# Patient Record
Sex: FEMALE | Race: WHITE | NOT HISPANIC OR LATINO | Employment: PART TIME | URBAN - METROPOLITAN AREA
[De-identification: names, ages, dates, MRNs, and addresses within clinical notes are randomized per-mention and may not be internally consistent; named-entity substitution may affect disease eponyms.]

---

## 2017-01-13 ENCOUNTER — OFFICE VISIT (OUTPATIENT)
Dept: INTERNAL MEDICINE | Facility: CLINIC | Age: 62
End: 2017-01-13

## 2017-01-13 ENCOUNTER — APPOINTMENT (OUTPATIENT)
Dept: WOMENS IMAGING | Facility: HOSPITAL | Age: 62
End: 2017-01-13

## 2017-01-13 VITALS
HEART RATE: 92 BPM | SYSTOLIC BLOOD PRESSURE: 128 MMHG | WEIGHT: 219.2 LBS | DIASTOLIC BLOOD PRESSURE: 82 MMHG | BODY MASS INDEX: 33.22 KG/M2 | HEIGHT: 68 IN

## 2017-01-13 DIAGNOSIS — I10 ESSENTIAL HYPERTENSION: Primary | ICD-10-CM

## 2017-01-13 DIAGNOSIS — Z00.00 ANNUAL PHYSICAL EXAM: ICD-10-CM

## 2017-01-13 DIAGNOSIS — F41.9 ANXIETY: ICD-10-CM

## 2017-01-13 DIAGNOSIS — Z12.4 SCREENING FOR MALIGNANT NEOPLASM OF CERVIX: ICD-10-CM

## 2017-01-13 DIAGNOSIS — M85.80 OSTEOPENIA: ICD-10-CM

## 2017-01-13 LAB
ALBUMIN SERPL-MCNC: 4.2 G/DL (ref 3.4–4.6)
ALBUMIN/GLOB SERPL: 1.1 G/DL
ALP SERPL-CCNC: 77 U/L (ref 46–116)
ALT SERPL W P-5'-P-CCNC: 23 U/L (ref 14–59)
ANION GAP SERPL CALCULATED.3IONS-SCNC: 10 MMOL/L
AST SERPL-CCNC: 17 U/L (ref 7–37)
BASOPHILS # BLD AUTO: 0.04 10*3/MM3 (ref 0–0.2)
BASOPHILS NFR BLD AUTO: 0.5 % (ref 0–2)
BILIRUB SERPL-MCNC: 0.6 MG/DL (ref 0.2–1)
BUN BLD-MCNC: 12 MG/DL (ref 6–22)
BUN/CREAT SERPL: 15.8 (ref 7–25)
CALCIUM SPEC-SCNC: 9.2 MG/DL (ref 8.6–10.5)
CHLORIDE SERPL-SCNC: 97 MMOL/L (ref 95–107)
CHOLEST SERPL-MCNC: 202 MG/DL (ref 0–200)
CO2 SERPL-SCNC: 30 MMOL/L (ref 23–32)
CREAT BLD-MCNC: 0.76 MG/DL (ref 0.55–1.02)
DEPRECATED RDW RBC AUTO: 40.6 FL (ref 37–54)
EOSINOPHIL # BLD AUTO: 0.29 10*3/MM3 (ref 0–0.7)
EOSINOPHIL NFR BLD AUTO: 3.3 % (ref 0–5)
ERYTHROCYTE [DISTWIDTH] IN BLOOD BY AUTOMATED COUNT: 13 % (ref 11.5–15)
GFR SERPL CREATININE-BSD FRML MDRD: 78 ML/MIN/1.73
GLOBULIN UR ELPH-MCNC: 3.9 GM/DL
GLUCOSE BLD-MCNC: 103 MG/DL (ref 70–100)
HCT VFR BLD AUTO: 40.5 % (ref 34.1–44.9)
HDLC SERPL-MCNC: 72 MG/DL (ref 40–81)
HGB BLD-MCNC: 13.2 G/DL (ref 11.2–15.7)
LDLC SERPL CALC-MCNC: 105 MG/DL (ref 0–100)
LDLC/HDLC SERPL: 1.45 {RATIO}
LYMPHOCYTES # BLD AUTO: 1.94 10*3/MM3 (ref 0.8–7)
LYMPHOCYTES NFR BLD AUTO: 22.2 % (ref 10–60)
MCH RBC QN AUTO: 28.4 PG (ref 26–34)
MCHC RBC AUTO-ENTMCNC: 32.6 G/DL (ref 31–37)
MCV RBC AUTO: 87.1 FL (ref 80–100)
MONOCYTES # BLD AUTO: 0.88 10*3/MM3 (ref 0–1)
MONOCYTES NFR BLD AUTO: 10.1 % (ref 0–13)
NEUTROPHILS # BLD AUTO: 5.57 10*3/MM3 (ref 1–11)
NEUTROPHILS NFR BLD AUTO: 63.9 % (ref 30–85)
PLATELET # BLD AUTO: 302 10*3/MM3 (ref 150–450)
PMV BLD AUTO: 10 FL (ref 6–12)
POTASSIUM BLD-SCNC: 4.2 MMOL/L (ref 3.3–5.3)
PROT SERPL-MCNC: 8.1 G/DL (ref 6.3–8.4)
RBC # BLD AUTO: 4.65 10*6/MM3 (ref 3.93–5.22)
SODIUM BLD-SCNC: 137 MMOL/L (ref 136–145)
T4 FREE SERPL-MCNC: 1.01 NG/DL (ref 0.93–1.7)
TRIGL SERPL-MCNC: 127 MG/DL (ref 0–150)
TSH SERPL DL<=0.05 MIU/L-ACNC: 1.13 MIU/ML (ref 0.4–4.2)
VLDLC SERPL-MCNC: 25.4 MG/DL
WBC NRBC COR # BLD: 8.72 10*3/MM3 (ref 5–10)

## 2017-01-13 PROCEDURE — 71020 CHG CHEST X-RAY 2 VW: CPT | Performed by: RADIOLOGY

## 2017-01-13 PROCEDURE — 93000 ELECTROCARDIOGRAM COMPLETE: CPT | Performed by: INTERNAL MEDICINE

## 2017-01-13 PROCEDURE — 80061 LIPID PANEL: CPT | Performed by: INTERNAL MEDICINE

## 2017-01-13 PROCEDURE — 84443 ASSAY THYROID STIM HORMONE: CPT | Performed by: INTERNAL MEDICINE

## 2017-01-13 PROCEDURE — 36415 COLL VENOUS BLD VENIPUNCTURE: CPT | Performed by: INTERNAL MEDICINE

## 2017-01-13 PROCEDURE — 85025 COMPLETE CBC W/AUTO DIFF WBC: CPT | Performed by: INTERNAL MEDICINE

## 2017-01-13 PROCEDURE — 99396 PREV VISIT EST AGE 40-64: CPT | Performed by: INTERNAL MEDICINE

## 2017-01-13 PROCEDURE — 80053 COMPREHEN METABOLIC PANEL: CPT | Performed by: INTERNAL MEDICINE

## 2017-01-13 PROCEDURE — 71020 XR CHEST 2 VW: CPT | Performed by: INTERNAL MEDICINE

## 2017-01-13 NOTE — PROGRESS NOTES
Subjective   Karishma Ocasio is a 61 y.o. female.     HPI Comments: Here for annual physical    Hypertension   This is a chronic problem. The current episode started more than 1 year ago. Pertinent negatives include no chest pain or palpitations.        The following portions of the patient's history were reviewed and updated as appropriate: allergies, current medications, past family history, past medical history, past social history, past surgical history and problem list.    Review of Systems   Constitutional:        Has been doing wellNonew problems   HENT: Positive for postnasal drip (HAs a cold).    Eyes: Positive for visual disturbance (Reading glasses).   Respiratory: Negative.    Cardiovascular: Negative for chest pain and palpitations.        BP checks have been satisfactory   Gastrointestinal:        Will suzette Cscope next year   Endocrine: Negative.    Genitourinary: Negative.    Musculoskeletal: Negative.    Allergic/Immunologic: Negative.    Neurological: Negative.    Hematological: Negative.    Psychiatric/Behavioral: Negative.        Objective   Physical Exam   Constitutional: She is oriented to person, place, and time. She appears well-developed and well-nourished.   HENT:   Head: Normocephalic.   Right Ear: External ear normal.   Left Ear: External ear normal.   Nose: Nose normal.   Mouth/Throat: Oropharynx is clear and moist.   Eyes: Conjunctivae and EOM are normal. Pupils are equal, round, and reactive to light.   Neck: Normal range of motion. Neck supple.   Cardiovascular: Normal rate, regular rhythm, normal heart sounds and intact distal pulses.    Repeat 144/90   Pulmonary/Chest: Effort normal and breath sounds normal.   Abdominal: Soft. Bowel sounds are normal.   Genitourinary: Vagina normal and uterus normal.   Musculoskeletal: Normal range of motion.   Neurological: She is alert and oriented to person, place, and time. She has normal reflexes.   Skin: Skin is warm and dry.   Psychiatric: She  has a normal mood and affect. Her behavior is normal. Judgment and thought content normal.   Vitals reviewed.      Assessment/Plan   Karishma was seen today for annual exam.    Diagnoses and all orders for this visit:    Essential hypertension  -     CBC Auto Differential; Future  -     Comprehensive Metabolic Panel; Future  -     XR Chest 2 View    Annual physical exam  -     Lipid Panel; Future  -     TSH; Future  -     T4, Free; Future    Screening for malignant neoplasm of cervix  -     Pap IG, HPV-hr; Future    Osteopenia    Anxiety      ECG 12 Lead  Date/Time: 1/13/2017 10:17 AM  Performed by: MADDIE RODRIGUEZ  Authorized by: MADDIE RODRIGUEZ   Rhythm: sinus rhythm  Conduction: conduction normal  ST Segments: ST segments normal  T Waves: T waves normal  Other: no other findings  Clinical impression: normal ECG  Comments: No priors

## 2017-01-13 NOTE — MR AVS SNAPSHOT
Karishma MANZO Ninfa   1/13/2017 9:30 AM   Office Visit    Dept Phone:  [phone]   Encounter #:  [id number]    Provider:  Kane Álvarez MD   Department:  [facility] INTERNAL MEDICINE                Your Full Care Plan              Your Updated Medication List          This list is accurate as of: 1/13/17 11:11 AM.  Always use your most recent med list.                CENTRUM SILVER ULTRA WOMENS PO       escitalopram 10 MG tablet   Commonly known as:  LEXAPRO   take 1 tablet by mouth once daily       * triamterene-hydrochlorothiazide 37.5-25 MG per tablet   Commonly known as:  MAXZIDE-25   Take 1 tablet by mouth daily.       * triamterene-hydrochlorothiazide 37.5-25 MG per capsule   Commonly known as:  DYAZIDE   take 1 capsule by mouth once daily       * Notice:  This list has 2 medication(s) that are the same as other medications prescribed for you. Read the directions carefully, and ask your doctor or other care provider to review them with you.            We Performed the Following     ECG 12 Lead     XR Chest 2 View       You Were Diagnosed With        Codes Comments    Essential hypertension    -  Primary ICD-10-CM: I10  ICD-9-CM: 401.9     Annual physical exam     ICD-10-CM: Z00.00  ICD-9-CM: V70.0     Screening for malignant neoplasm of cervix     ICD-10-CM: Z12.4  ICD-9-CM: V76.2     Osteopenia     ICD-10-CM: M85.80  ICD-9-CM: 733.90     Anxiety     ICD-10-CM: F41.9  ICD-9-CM: 300.00       Instructions     None    Patient Instructions History      Upcoming Appointments     Visit Type Date Time Department    PHYSICAL 1/13/2017  9:30 AM [facility]    M[facility]MO [facility] SCREEN BILAT 1/24/2017  9:15 AM  [facility]      [facility] Signup     [employer] [facility] [email or web address] allows you to send messages to your doctor, view your test results, renew your prescriptions, schedule appointments, and more. To sign up, go to [employer] and click on the Sign Up Now link in the New  "User? box. Enter your [employer] Activation Code exactly as it appears below along with the last four digits of your Social Security Number and your Date of Birth () to complete the sign-up process. If you do not sign up before the expiration date, you must request a new code.    [employer] Activation Code: [email or web address]  Expires: 2017 11:11 AM    If you have questions, you can email [email or web address] or call [phone] to talk to our [employer] staff. Remember, [employer] is NOT to be used for urgent needs. For medical emergencies, dial 911.               Other Info from Your Visit           Your Appointments     2017  9:15 AM EST   Mammo daysi screen bilat with [facility] 2   [facility] ([place])    [address] [facility] [address] [phone]            Bring any films and reports from outside facilities. Arrive 15 min prior to exam time. Do not apply any lotions, oils, body sprays, powders, perfumes, or deodorants on the day of the exam. Bring a current list of medications. Arrive 15 mi              Allergies     No Known Allergies      Reason for Visit     Annual Exam CPE      Vital Signs     Blood Pressure Pulse Height Weight Body Mass Index Smoking Status    128/82 92 68\" (172.7 cm) 219 lb 3.2 oz (99.4 kg) 33.33 kg/m2 Former Smoker      Problems and Diagnoses Noted     Anxiety problem    High blood pressure    Bone disease    Annual physical exam        Screening for cervical cancer          Results     XR Chest 2 View               "

## 2017-01-17 LAB
CHROM ANALY OVERALL INTERP-IMP: NORMAL
CONV .: NORMAL
CONV PERFORMED BY:: NORMAL
DX ICD CODE: NORMAL
HIV 1 & 2 AB SER-IMP: NORMAL
HPV I/H RISK 1 DNA CVX QL PROBE+SIG AMP: NEGATIVE
REF LAB TEST METHOD: NORMAL
STAT OF ADQ CVX/VAG CYTO-IMP: NORMAL

## 2017-01-24 ENCOUNTER — HOSPITAL ENCOUNTER (OUTPATIENT)
Dept: MAMMOGRAPHY | Facility: HOSPITAL | Age: 62
Discharge: HOME | End: 2017-01-24
Attending: INTERNAL MEDICINE | Admitting: INTERNAL MEDICINE

## 2017-01-24 DIAGNOSIS — Z13.9 SCREENING: ICD-10-CM

## 2017-01-24 PROCEDURE — G0202 SCR MAMMO BI INCL CAD: HCPCS

## 2017-03-13 ENCOUNTER — OFFICE VISIT (OUTPATIENT)
Dept: INTERNAL MEDICINE | Facility: CLINIC | Age: 62
End: 2017-03-13

## 2017-03-13 VITALS
SYSTOLIC BLOOD PRESSURE: 144 MMHG | WEIGHT: 219 LBS | HEART RATE: 81 BPM | DIASTOLIC BLOOD PRESSURE: 98 MMHG | OXYGEN SATURATION: 98 % | HEIGHT: 68 IN | BODY MASS INDEX: 33.19 KG/M2

## 2017-03-13 DIAGNOSIS — Z86.711 HX PULMONARY EMBOLISM: ICD-10-CM

## 2017-03-13 DIAGNOSIS — R00.2 PALPITATIONS: ICD-10-CM

## 2017-03-13 DIAGNOSIS — I10 ESSENTIAL HYPERTENSION: Primary | ICD-10-CM

## 2017-03-13 PROCEDURE — 99213 OFFICE O/P EST LOW 20 MIN: CPT | Performed by: INTERNAL MEDICINE

## 2017-03-23 ENCOUNTER — HOSPITAL ENCOUNTER (OUTPATIENT)
Dept: CARDIOLOGY | Facility: HOSPITAL | Age: 62
Discharge: HOME | End: 2017-03-23
Attending: INTERNAL MEDICINE

## 2017-03-23 ENCOUNTER — HOSPITAL ENCOUNTER (OUTPATIENT)
Dept: CARDIOLOGY | Facility: HOSPITAL | Age: 62
Discharge: HOME | End: 2017-03-23
Attending: INTERNAL MEDICINE | Admitting: INTERNAL MEDICINE

## 2017-03-23 VITALS
DIASTOLIC BLOOD PRESSURE: 40 MMHG | HEART RATE: 92 BPM | SYSTOLIC BLOOD PRESSURE: 140 MMHG | WEIGHT: 219 LBS | BODY MASS INDEX: 33.19 KG/M2 | HEIGHT: 68 IN

## 2017-03-23 DIAGNOSIS — R00.2 PALPITATIONS: ICD-10-CM

## 2017-03-23 LAB
ASCENDING AORTA: 3.4 CM
BH CV ECHO MEAS - ACS: 2.3 CM
BH CV ECHO MEAS - AO MAX PG (FULL): 4.4 MMHG
BH CV ECHO MEAS - AO MAX PG: 8 MMHG
BH CV ECHO MEAS - AO MEAN PG (FULL): 2.5 MMHG
BH CV ECHO MEAS - AO MEAN PG: 4.2 MMHG
BH CV ECHO MEAS - AO ROOT AREA (BSA CORRECTED): 1.7
BH CV ECHO MEAS - AO ROOT AREA: 9.8 CM^2
BH CV ECHO MEAS - AO ROOT DIAM: 3.5 CM
BH CV ECHO MEAS - AO V2 MAX: 141.2 CM/SEC
BH CV ECHO MEAS - AO V2 MEAN: 96.3 CM/SEC
BH CV ECHO MEAS - AO V2 VTI: 28.8 CM
BH CV ECHO MEAS - AVA(I,A): 2 CM^2
BH CV ECHO MEAS - AVA(I,D): 2 CM^2
BH CV ECHO MEAS - AVA(V,A): 2.6 CM^2
BH CV ECHO MEAS - AVA(V,D): 2.6 CM^2
BH CV ECHO MEAS - BSA(HAYCOCK): 2.2 M^2
BH CV ECHO MEAS - BSA: 2.1 M^2
BH CV ECHO MEAS - BZI_BMI: 33.1 KILOGRAMS/M^2
BH CV ECHO MEAS - BZI_METRIC_HEIGHT: 172.7 CM
BH CV ECHO MEAS - BZI_METRIC_WEIGHT: 98.9 KG
BH CV ECHO MEAS - CONTRAST EF (2CH): 56.7 ML/M^2
BH CV ECHO MEAS - CONTRAST EF 4CH: 62.6 ML/M^2
BH CV ECHO MEAS - EDV(MOD-SP2): 67 ML
BH CV ECHO MEAS - EDV(MOD-SP4): 91 ML
BH CV ECHO MEAS - EDV(TEICH): 125.3 ML
BH CV ECHO MEAS - EF(CUBED): 80.6 %
BH CV ECHO MEAS - EF(MOD-SP2): 56.7 %
BH CV ECHO MEAS - EF(MOD-SP4): 62.6 %
BH CV ECHO MEAS - EF(TEICH): 72.7 %
BH CV ECHO MEAS - ESV(MOD-SP2): 29 ML
BH CV ECHO MEAS - ESV(MOD-SP4): 34 ML
BH CV ECHO MEAS - ESV(TEICH): 34.1 ML
BH CV ECHO MEAS - FS: 42.1 %
BH CV ECHO MEAS - IVS/LVPW: 1
BH CV ECHO MEAS - IVSD: 1.1 CM
BH CV ECHO MEAS - LAT PEAK E' VEL: 10 CM/SEC
BH CV ECHO MEAS - LV DIASTOLIC VOL/BSA (35-75): 42.9 ML/M^2
BH CV ECHO MEAS - LV MASS(C)D: 204.5 GRAMS
BH CV ECHO MEAS - LV MASS(C)DI: 96.5 GRAMS/M^2
BH CV ECHO MEAS - LV MAX PG: 3.6 MMHG
BH CV ECHO MEAS - LV MEAN PG: 1.8 MMHG
BH CV ECHO MEAS - LV SYSTOLIC VOL/BSA (12-30): 16 ML/M^2
BH CV ECHO MEAS - LV V1 MAX: 95.1 CM/SEC
BH CV ECHO MEAS - LV V1 MEAN: 62 CM/SEC
BH CV ECHO MEAS - LV V1 VTI: 15.5 CM
BH CV ECHO MEAS - LVIDD: 5.1 CM
BH CV ECHO MEAS - LVIDS: 3 CM
BH CV ECHO MEAS - LVLD AP2: 7.4 CM
BH CV ECHO MEAS - LVLD AP4: 8.3 CM
BH CV ECHO MEAS - LVLS AP2: 6.3 CM
BH CV ECHO MEAS - LVLS AP4: 6.7 CM
BH CV ECHO MEAS - LVOT AREA (M): 3.8 CM^2
BH CV ECHO MEAS - LVOT AREA: 3.8 CM^2
BH CV ECHO MEAS - LVOT DIAM: 2.2 CM
BH CV ECHO MEAS - LVPWD: 1.1 CM
BH CV ECHO MEAS - MED PEAK E' VEL: 6 CM/SEC
BH CV ECHO MEAS - MV A DUR: 0.13 SEC
BH CV ECHO MEAS - MV A MAX VEL: 90.7 CM/SEC
BH CV ECHO MEAS - MV DEC SLOPE: 191.7 CM/SEC^2
BH CV ECHO MEAS - MV DEC TIME: 0.23 SEC
BH CV ECHO MEAS - MV E MAX VEL: 42.2 CM/SEC
BH CV ECHO MEAS - MV E/A: 0.47
BH CV ECHO MEAS - MV MAX PG: 3.6 MMHG
BH CV ECHO MEAS - MV MEAN PG: 1.5 MMHG
BH CV ECHO MEAS - MV P1/2T MAX VEL: 48 CM/SEC
BH CV ECHO MEAS - MV P1/2T: 73.4 MSEC
BH CV ECHO MEAS - MV V2 MAX: 95 CM/SEC
BH CV ECHO MEAS - MV V2 MEAN: 56.4 CM/SEC
BH CV ECHO MEAS - MV V2 VTI: 17.3 CM
BH CV ECHO MEAS - MVA P1/2T LCG: 4.6 CM^2
BH CV ECHO MEAS - MVA(P1/2T): 3 CM^2
BH CV ECHO MEAS - MVA(VTI): 3.4 CM^2
BH CV ECHO MEAS - PA ACC TIME: 0.17 SEC
BH CV ECHO MEAS - PA MAX PG (FULL): 1.7 MMHG
BH CV ECHO MEAS - PA MAX PG: 4.7 MMHG
BH CV ECHO MEAS - PA PR(ACCEL): 4.5 MMHG
BH CV ECHO MEAS - PA V2 MAX: 108.6 CM/SEC
BH CV ECHO MEAS - PULM DIAS VEL: 41.2 CM/SEC
BH CV ECHO MEAS - PULM S/D: 1.3
BH CV ECHO MEAS - PULM SYS VEL: 52.9 CM/SEC
BH CV ECHO MEAS - PVA(V,A): 2 CM^2
BH CV ECHO MEAS - PVA(V,D): 2 CM^2
BH CV ECHO MEAS - QP/QS: 0.85
BH CV ECHO MEAS - RAP SYSTOLE: 3 MMHG
BH CV ECHO MEAS - RV MAX PG: 3 MMHG
BH CV ECHO MEAS - RV MEAN PG: 1.6 MMHG
BH CV ECHO MEAS - RV V1 MAX: 86.8 CM/SEC
BH CV ECHO MEAS - RV V1 MEAN: 59.4 CM/SEC
BH CV ECHO MEAS - RV V1 VTI: 19.6 CM
BH CV ECHO MEAS - RVOT AREA: 2.6 CM^2
BH CV ECHO MEAS - RVOT DIAM: 1.8 CM
BH CV ECHO MEAS - RVSP: 17 MMHG
BH CV ECHO MEAS - SI(AO): 133.8 ML/M^2
BH CV ECHO MEAS - SI(CUBED): 51.2 ML/M^2
BH CV ECHO MEAS - SI(LVOT): 27.8 ML/M^2
BH CV ECHO MEAS - SI(MOD-SP2): 17.9 ML/M^2
BH CV ECHO MEAS - SI(MOD-SP4): 26.9 ML/M^2
BH CV ECHO MEAS - SI(TEICH): 43 ML/M^2
BH CV ECHO MEAS - SUP REN AO DIAM: 1.8 CM
BH CV ECHO MEAS - SV(AO): 283.6 ML
BH CV ECHO MEAS - SV(CUBED): 108.5 ML
BH CV ECHO MEAS - SV(LVOT): 58.9 ML
BH CV ECHO MEAS - SV(MOD-SP2): 38 ML
BH CV ECHO MEAS - SV(MOD-SP4): 57 ML
BH CV ECHO MEAS - SV(RVOT): 50.1 ML
BH CV ECHO MEAS - SV(TEICH): 91.1 ML
BH CV ECHO MEAS - TAPSE (>1.6): 1.8 CM2
BH CV ECHO MEAS - TR MAX VEL: 186 CM/SEC
BH CV XLRA - RV BASE: 2.4 CM
BH CV XLRA - TDI S': 11 CM/SEC
E/E' RATIO: 6
LEFT ATRIUM VOLUME INDEX: 14 ML/M2
SINUS: 3.2 CM
STJ: 2.8 CM

## 2017-03-23 PROCEDURE — 93306 TTE W/DOPPLER COMPLETE: CPT | Performed by: INTERNAL MEDICINE

## 2017-03-23 PROCEDURE — 93306 TTE W/DOPPLER COMPLETE: CPT

## 2017-03-28 ENCOUNTER — TELEPHONE (OUTPATIENT)
Dept: INTERNAL MEDICINE | Facility: CLINIC | Age: 62
End: 2017-03-28

## 2017-03-28 NOTE — TELEPHONE ENCOUNTER
----- Message from Celina Galarza sent at 3/28/2017 10:59 AM EDT -----  Contact: pt  Please call pt back today with Holter results. She is leaving for [place] tomorrow. Thanks    Pt # [phone]

## 2017-04-10 ENCOUNTER — OFFICE VISIT (OUTPATIENT)
Dept: INTERNAL MEDICINE | Facility: CLINIC | Age: 62
End: 2017-04-10

## 2017-04-10 VITALS
WEIGHT: 218 LBS | HEART RATE: 118 BPM | HEIGHT: 68 IN | OXYGEN SATURATION: 96 % | BODY MASS INDEX: 33.04 KG/M2 | DIASTOLIC BLOOD PRESSURE: 88 MMHG | SYSTOLIC BLOOD PRESSURE: 130 MMHG

## 2017-04-10 DIAGNOSIS — I10 ESSENTIAL HYPERTENSION: Primary | ICD-10-CM

## 2017-04-10 DIAGNOSIS — M85.80 OSTEOPENIA: ICD-10-CM

## 2017-04-10 PROCEDURE — 99213 OFFICE O/P EST LOW 20 MIN: CPT | Performed by: INTERNAL MEDICINE

## 2017-04-10 RX ORDER — ATENOLOL 25 MG/1
25 TABLET ORAL DAILY
Qty: 30 TABLET | Refills: 3 | Status: SHIPPED | OUTPATIENT
Start: 2017-04-10 | End: 2017-08-10 | Stop reason: SDUPTHER

## 2017-04-10 NOTE — PROGRESS NOTES
Subjective   Karishma Ocasio is a 61 y.o. female.     Hypertension   This is a chronic problem. Associated symptoms include palpitations (Will check her pulse & is usually around 90 ).        The following portions of the patient's history were reviewed and updated as appropriate: allergies, current medications, past family history, past medical history, past social history, past surgical history and problem list.    Review of Systems   Constitutional:        Here for F/U tachypalpitations   HENT: Negative.    Eyes: Negative.    Respiratory: Negative.    Cardiovascular: Positive for palpitations (Will check her pulse & is usually around 90 ).        Had cardiac W/U for rapid heart beat which was essentially normal    Genitourinary: Negative.    Musculoskeletal: Negative.    Neurological: Negative.        Objective   Physical Exam   Constitutional: She appears well-developed.   HENT:   Head: Normocephalic.   Eyes: EOM are normal.   Neck: Neck supple.   Cardiovascular: Normal rate, regular rhythm and normal heart sounds.    Repeat 130/90   Pulmonary/Chest: Effort normal and breath sounds normal.   Musculoskeletal: Normal range of motion.   Neurological: She is alert.       Assessment/Plan   Karishma was seen today for follow-up and hypertension.    Diagnoses and all orders for this visit:    Essential hypertension  -     atenolol (TENORMIN) 25 MG tablet; Take 1 tablet by mouth Daily.    Osteopenia

## 2017-05-11 ENCOUNTER — OFFICE VISIT (OUTPATIENT)
Dept: INTERNAL MEDICINE | Facility: CLINIC | Age: 62
End: 2017-05-11

## 2017-05-11 VITALS
SYSTOLIC BLOOD PRESSURE: 124 MMHG | DIASTOLIC BLOOD PRESSURE: 90 MMHG | WEIGHT: 220 LBS | OXYGEN SATURATION: 93 % | HEIGHT: 68 IN | BODY MASS INDEX: 33.34 KG/M2 | HEART RATE: 72 BPM

## 2017-05-11 DIAGNOSIS — I10 ESSENTIAL HYPERTENSION: Primary | ICD-10-CM

## 2017-05-11 PROCEDURE — 99213 OFFICE O/P EST LOW 20 MIN: CPT | Performed by: INTERNAL MEDICINE

## 2017-07-03 ENCOUNTER — OFFICE VISIT (OUTPATIENT)
Dept: INTERNAL MEDICINE | Facility: CLINIC | Age: 62
End: 2017-07-03

## 2017-07-03 VITALS
TEMPERATURE: 98.4 F | DIASTOLIC BLOOD PRESSURE: 82 MMHG | WEIGHT: 220 LBS | HEART RATE: 84 BPM | BODY MASS INDEX: 33.34 KG/M2 | HEIGHT: 68 IN | SYSTOLIC BLOOD PRESSURE: 120 MMHG | OXYGEN SATURATION: 94 %

## 2017-07-03 DIAGNOSIS — L23.9 ALLERGIC CONTACT DERMATITIS, UNSPECIFIED TRIGGER: Primary | ICD-10-CM

## 2017-07-03 PROCEDURE — 99213 OFFICE O/P EST LOW 20 MIN: CPT | Performed by: NURSE PRACTITIONER

## 2017-07-04 PROBLEM — I26.99 PE (PULMONARY EMBOLISM): Status: ACTIVE | Noted: 2017-07-04

## 2017-07-04 RX ORDER — DEXAMETHASONE 1.5 MG/1
TABLET ORAL
Qty: 35 TABLET | Refills: 0 | OUTPATIENT
Start: 2017-07-04 | End: 2017-09-05

## 2017-07-04 NOTE — PROGRESS NOTES
Subjective   Karishma Ocasio is a 61 y.o. female who presents due to a rash.    HPI Comments: She continues to co a pruritic rash which is slowly spreading despite completing a Medrol Jayro (recent exposure to poison ivy). She now c/o a rash with swelling on her left upper eyelid.    Poison Ivy   This is a new problem. The current episode started 1 to 4 weeks ago. The problem has been gradually worsening since onset. The affected locations include the left eye, left upper leg, left lower leg, right upper leg, left hand, left arm, left elbow, right arm and right elbow. The rash is characterized by redness and itchiness. She was exposed to plant contact. Pertinent negatives include no congestion, cough, fatigue, fever, sore throat or vomiting. Treatments tried: Medrol Dose Jayro. The treatment provided mild relief.        The following portions of the patient's history were reviewed and updated as appropriate: allergies, current medications, past social history and problem list.    Past Medical History:   Diagnosis Date   • Anxiety    • Hypertension    • Osteopenia          Current Outpatient Prescriptions:   •  atenolol (TENORMIN) 25 MG tablet, Take 1 tablet by mouth Daily., Disp: 30 tablet, Rfl: 3  •  escitalopram (LEXAPRO) 10 MG tablet, take 1 tablet by mouth once daily, Disp: 90 tablet, Rfl: 3  •  Multiple Vitamins-Minerals (CENTRUM SILVER ULTRA WOMENS PO), Take  by mouth daily., Disp: , Rfl:   •  triamterene-hydrochlorothiazide (DYAZIDE) 37.5-25 MG per capsule, take 1 capsule by mouth once daily, Disp: 90 capsule, Rfl: 3  •  dexamethasone (DECADRON) 1.5 MG tablet, Directions for 10 day Dexpak, Disp: 35 tablet, Rfl: 0    No Known Allergies    Review of Systems   Constitutional: Negative for chills, fatigue, fever and unexpected weight change.   HENT: Negative for congestion, ear pain, postnasal drip, sinus pressure, sore throat and trouble swallowing.    Eyes: Negative for visual disturbance.   Respiratory: Negative  "for cough, chest tightness and wheezing.    Cardiovascular: Negative for chest pain, palpitations and leg swelling.   Gastrointestinal: Negative for abdominal pain, blood in stool, nausea and vomiting.   Endocrine: Negative for cold intolerance and heat intolerance.   Genitourinary: Negative for dysuria, frequency and urgency.   Musculoskeletal: Negative for arthralgias and joint swelling.   Skin: Positive for rash. Negative for color change.   Allergic/Immunologic: Negative for immunocompromised state.   Neurological: Negative for syncope, weakness and headaches.   Hematological: Does not bruise/bleed easily.       Objective   Vitals:    07/03/17 0833   BP: 120/82   BP Location: Left arm   Pulse: 84   Temp: 98.4 °F (36.9 °C)   SpO2: 94%   Weight: 220 lb (99.8 kg)   Height: 68\" (172.7 cm)     Physical Exam   Constitutional: She is oriented to person, place, and time. She appears well-developed and well-nourished. No distress.   HENT:   Head: Normocephalic and atraumatic.   Right Ear: External ear normal.   Left Ear: External ear normal.   Eyes: Conjunctivae are normal.   Neck: Normal range of motion. Neck supple.   Cardiovascular: Normal rate, regular rhythm, normal heart sounds and intact distal pulses.  Exam reveals no gallop and no friction rub.    No murmur heard.  Pulmonary/Chest: Effort normal and breath sounds normal. No respiratory distress.   Lymphadenopathy:     She has no cervical adenopathy.   Neurological: She is alert and oriented to person, place, and time.   Skin: Skin is warm and dry. Rash noted. Rash is maculopapular and vesicular. No erythema.   Scattered erythematous, vesicular and maculopapular rash. There is swelling with erythema of the left upper eyelid.   Psychiatric: She has a normal mood and affect. Her behavior is normal.   Nursing note and vitals reviewed.      Assessment/Plan   Karishma was seen today for poison ivy.    Diagnoses and all orders for this visit:    Allergic contact " dermatitis, unspecified trigger  Comments:  continue Benadryl, apply cool compresses as needed  Orders:  -     dexamethasone (DECADRON) 1.5 MG tablet; Directions for 10 day Dexpak

## 2017-08-10 DIAGNOSIS — I10 ESSENTIAL HYPERTENSION: ICD-10-CM

## 2017-08-10 RX ORDER — ATENOLOL 25 MG/1
TABLET ORAL
Qty: 30 TABLET | Refills: 3 | Status: SHIPPED | OUTPATIENT
Start: 2017-08-10 | End: 2018-07-05 | Stop reason: SDUPTHER

## 2017-09-05 ENCOUNTER — OFFICE VISIT (OUTPATIENT)
Dept: INTERNAL MEDICINE | Facility: CLINIC | Age: 62
End: 2017-09-05

## 2017-09-05 VITALS
SYSTOLIC BLOOD PRESSURE: 122 MMHG | HEIGHT: 68 IN | DIASTOLIC BLOOD PRESSURE: 82 MMHG | WEIGHT: 221 LBS | BODY MASS INDEX: 33.49 KG/M2

## 2017-09-05 DIAGNOSIS — M85.80 OSTEOPENIA: ICD-10-CM

## 2017-09-05 DIAGNOSIS — F41.9 ANXIETY: ICD-10-CM

## 2017-09-05 DIAGNOSIS — I10 ESSENTIAL HYPERTENSION: Primary | ICD-10-CM

## 2017-09-05 LAB
ALBUMIN SERPL-MCNC: 4.7 G/DL (ref 3.5–5.2)
ALBUMIN/GLOB SERPL: 1.6 G/DL
ALP SERPL-CCNC: 62 U/L (ref 39–117)
ALT SERPL-CCNC: 13 U/L (ref 1–33)
AST SERPL-CCNC: 16 U/L (ref 1–32)
BASOPHILS # BLD AUTO: 0.06 10*3/MM3 (ref 0–0.2)
BASOPHILS NFR BLD AUTO: 0.8 % (ref 0–1.5)
BILIRUB SERPL-MCNC: 0.6 MG/DL (ref 0.1–1.2)
BUN SERPL-MCNC: 17 MG/DL (ref 8–23)
BUN/CREAT SERPL: 23 (ref 7–25)
CALCIUM SERPL-MCNC: 10.2 MG/DL (ref 8.6–10.5)
CHLORIDE SERPL-SCNC: 96 MMOL/L (ref 98–107)
CHOLEST SERPL-MCNC: 205 MG/DL (ref 0–200)
CO2 SERPL-SCNC: 22.6 MMOL/L (ref 22–29)
CREAT SERPL-MCNC: 0.74 MG/DL (ref 0.57–1)
EOSINOPHIL # BLD AUTO: 0.43 10*3/MM3 (ref 0–0.7)
EOSINOPHIL # BLD AUTO: 5.5 % (ref 0.3–6.2)
ERYTHROCYTE [DISTWIDTH] IN BLOOD BY AUTOMATED COUNT: 13.6 % (ref 11.7–13)
GLOBULIN SER CALC-MCNC: 2.9 GM/DL
GLUCOSE SERPL-MCNC: 111 MG/DL (ref 65–99)
HCT VFR BLD AUTO: 41.3 % (ref 35.6–45.5)
HDLC SERPL-MCNC: 61 MG/DL (ref 40–60)
HGB BLD-MCNC: 13 G/DL (ref 11.9–15.5)
IMM GRANULOCYTES # BLD: 0.02 10*3/MM3 (ref 0–0.03)
IMM GRANULOCYTES NFR BLD: 0.3 % (ref 0–0.5)
LDLC SERPL CALC-MCNC: 105 MG/DL (ref 0–100)
LYMPHOCYTES # BLD AUTO: 1.95 10*3/MM3 (ref 0.9–4.8)
LYMPHOCYTES NFR BLD AUTO: 25.2 % (ref 19.6–45.3)
MCH RBC QN AUTO: 29.2 PG (ref 26.9–32)
MCHC RBC AUTO-ENTMCNC: 31.5 G/DL (ref 32.4–36.3)
MCV RBC AUTO: 92.8 FL (ref 80.5–98.2)
MONOCYTES # BLD AUTO: 0.61 10*3/MM3 (ref 0.2–1.2)
MONOCYTES NFR BLD AUTO: 7.9 % (ref 5–12)
NEUTROPHILS # BLD AUTO: 4.68 10*3/MM3 (ref 1.9–8.1)
NEUTROPHILS NFR BLD AUTO: 60.3 % (ref 42.7–76)
PLATELET # BLD AUTO: 308 10*3/MM3 (ref 140–500)
POTASSIUM SERPL-SCNC: 4.5 MMOL/L (ref 3.5–5.2)
PROT SERPL-MCNC: 7.6 G/DL (ref 6–8.5)
RBC # BLD AUTO: 4.45 10*6/MM3 (ref 3.9–5.2)
SODIUM SERPL-SCNC: 134 MMOL/L (ref 136–145)
TRIGL SERPL-MCNC: 196 MG/DL (ref 0–150)
VLDLC SERPL-MCNC: 39.2 MG/DL (ref 5–40)
WBC # BLD AUTO: 7.75 10*3/MM3 (ref 4.5–10.7)

## 2017-09-05 PROCEDURE — 36415 COLL VENOUS BLD VENIPUNCTURE: CPT | Performed by: INTERNAL MEDICINE

## 2017-09-05 PROCEDURE — 99214 OFFICE O/P EST MOD 30 MIN: CPT | Performed by: INTERNAL MEDICINE

## 2017-09-05 RX ORDER — NEBIVOLOL 5 MG/1
5 TABLET ORAL DAILY
Qty: 30 TABLET | Refills: 3 | Status: SHIPPED | OUTPATIENT
Start: 2017-09-05 | End: 2018-01-04 | Stop reason: SDUPTHER

## 2017-09-05 NOTE — PROGRESS NOTES
Subjective   Karishma Ocasio is a 62 y.o. female.     Hypertension   This is a chronic problem. The current episode started more than 1 year ago. The problem has been resolved since onset. The problem is controlled. Pertinent negatives include no anxiety, blurred vision, chest pain, headaches, malaise/fatigue, neck pain, orthopnea, palpitations, peripheral edema, PND, shortness of breath or sweats. Agents associated with hypertension include NSAIDs. Risk factors for coronary artery disease include family history and obesity. There are no compliance problems.         The following portions of the patient's history were reviewed and updated as appropriate: allergies, current medications, past family history, past medical history, past social history, past surgical history and problem list.    Review of Systems   Constitutional: Negative for malaise/fatigue.        Has been doing well No new problems   HENT: Negative.    Eyes: Negative.  Negative for blurred vision.   Respiratory: Negative for shortness of breath.    Cardiovascular: Negative for chest pain, palpitations, orthopnea and PND.        BP has been well controlled   Gastrointestinal: Negative.    Genitourinary: Negative.    Musculoskeletal: Positive for arthralgias ( Usual aches & pains). Negative for neck pain.   Neurological: Negative for headaches.       Objective   Physical Exam   Constitutional: She is oriented to person, place, and time. She appears well-developed.   HENT:   Head: Normocephalic.   Eyes: EOM are normal.   Neck: Neck supple.   Cardiovascular: Normal rate, regular rhythm and normal heart sounds.    Paxory925/80   Pulmonary/Chest: Effort normal and breath sounds normal.   Musculoskeletal: Normal range of motion.   Neurological: She is alert and oriented to person, place, and time.   Vitals reviewed.      Assessment/Plan   Karishma was seen today for hypertension and follow-up.    Diagnoses and all orders for this visit:    Essential  hypertension  -     nebivolol (BYSTOLIC) 5 MG tablet; Take 1 tablet by mouth Daily.  -     CBC Auto Differential; Future  -     Comprehensive Metabolic Panel; Future  -     Lipid Panel; Future    Osteopenia    Anxiety

## 2017-10-24 ENCOUNTER — TELEPHONE (OUTPATIENT)
Dept: INTERNAL MEDICINE | Facility: CLINIC | Age: 62
End: 2017-10-24

## 2017-10-24 DIAGNOSIS — I10 ESSENTIAL HYPERTENSION: ICD-10-CM

## 2017-10-24 RX ORDER — ESCITALOPRAM OXALATE 10 MG/1
10 TABLET ORAL DAILY
Qty: 90 TABLET | Refills: 3 | Status: SHIPPED | OUTPATIENT
Start: 2017-10-24 | End: 2018-10-09 | Stop reason: SDUPTHER

## 2017-10-24 NOTE — TELEPHONE ENCOUNTER
"----- Message from Nubia Carbajal sent at 10/24/2017 10:57 AM EDT -----  Contact: Pt  Pt would like a refill for   escitalopram (LEXAPRO) 10 MG tablet      \"asap as she is going out of town\"    Pt# [phone]  "

## 2017-12-20 ENCOUNTER — TRANSCRIBE ORDERS (OUTPATIENT)
Dept: ADMINISTRATIVE | Facility: HOSPITAL | Age: 62
End: 2017-12-20

## 2017-12-20 DIAGNOSIS — Z12.39 SCREENING BREAST EXAMINATION: Primary | ICD-10-CM

## 2018-01-04 DIAGNOSIS — I10 ESSENTIAL HYPERTENSION: ICD-10-CM

## 2018-01-04 RX ORDER — TRIAMTERENE AND HYDROCHLOROTHIAZIDE 37.5; 25 MG/1; MG/1
1 CAPSULE ORAL DAILY
Qty: 90 CAPSULE | Refills: 3 | Status: SHIPPED | OUTPATIENT
Start: 2018-01-04 | End: 2019-03-27 | Stop reason: SDUPTHER

## 2018-01-04 RX ORDER — NEBIVOLOL 5 MG/1
5 TABLET ORAL DAILY
Qty: 90 TABLET | Refills: 3 | Status: SHIPPED | OUTPATIENT
Start: 2018-01-04 | End: 2018-03-15 | Stop reason: CLARIF

## 2018-01-25 ENCOUNTER — HOSPITAL ENCOUNTER (OUTPATIENT)
Dept: MAMMOGRAPHY | Facility: HOSPITAL | Age: 63
Discharge: HOME | End: 2018-01-25
Attending: INTERNAL MEDICINE | Admitting: INTERNAL MEDICINE

## 2018-01-25 DIAGNOSIS — Z12.39 SCREENING BREAST EXAMINATION: ICD-10-CM

## 2018-01-25 PROCEDURE — 77067 SCR MAMMO BI INCL CAD: CPT

## 2018-02-16 DIAGNOSIS — Z12.11 ENCOUNTER FOR SCREENING COLONOSCOPY: Primary | ICD-10-CM

## 2018-03-06 PROBLEM — Z12.11 ENCOUNTER FOR SCREENING COLONOSCOPY: Status: ACTIVE | Noted: 2018-03-06

## 2018-03-15 ENCOUNTER — OFFICE VISIT (OUTPATIENT)
Dept: INTERNAL MEDICINE | Facility: CLINIC | Age: 63
End: 2018-03-15

## 2018-03-15 VITALS
BODY MASS INDEX: 33.65 KG/M2 | WEIGHT: 222 LBS | SYSTOLIC BLOOD PRESSURE: 118 MMHG | DIASTOLIC BLOOD PRESSURE: 74 MMHG | HEIGHT: 68 IN

## 2018-03-15 DIAGNOSIS — I10 ESSENTIAL HYPERTENSION: Primary | ICD-10-CM

## 2018-03-15 DIAGNOSIS — F39 MOOD DISORDER (HCC): ICD-10-CM

## 2018-03-15 DIAGNOSIS — E03.9 HYPOTHYROIDISM, UNSPECIFIED TYPE: ICD-10-CM

## 2018-03-15 LAB
ALBUMIN SERPL-MCNC: 4.4 G/DL (ref 3.5–5.2)
ALBUMIN/GLOB SERPL: 1.3 G/DL
ALP SERPL-CCNC: 67 U/L (ref 39–117)
ALT SERPL W P-5'-P-CCNC: 19 U/L (ref 1–33)
ANION GAP SERPL CALCULATED.3IONS-SCNC: 14.6 MMOL/L
AST SERPL-CCNC: 17 U/L (ref 1–32)
BASOPHILS # BLD AUTO: 0.04 10*3/MM3 (ref 0–0.2)
BASOPHILS NFR BLD AUTO: 0.5 % (ref 0–2)
BILIRUB SERPL-MCNC: 0.5 MG/DL (ref 0.1–1.2)
BUN BLD-MCNC: 15 MG/DL (ref 8–23)
BUN/CREAT SERPL: 20 (ref 7–25)
CALCIUM SPEC-SCNC: 9.8 MG/DL (ref 8.6–10.5)
CHLORIDE SERPL-SCNC: 99 MMOL/L (ref 98–107)
CHOLEST SERPL-MCNC: 213 MG/DL (ref 0–200)
CO2 SERPL-SCNC: 26.4 MMOL/L (ref 22–29)
CREAT BLD-MCNC: 0.75 MG/DL (ref 0.57–1)
DEPRECATED RDW RBC AUTO: 41.5 FL (ref 37–54)
EOSINOPHIL # BLD AUTO: 0.36 10*3/MM3 (ref 0–0.7)
EOSINOPHIL NFR BLD AUTO: 4.7 % (ref 0–5)
ERYTHROCYTE [DISTWIDTH] IN BLOOD BY AUTOMATED COUNT: 13.1 % (ref 11.5–15)
GFR SERPL CREATININE-BSD FRML MDRD: 79 ML/MIN/1.73
GLOBULIN UR ELPH-MCNC: 3.3 GM/DL
GLUCOSE BLD-MCNC: 106 MG/DL (ref 65–99)
HCT VFR BLD AUTO: 39.1 % (ref 34.1–44.9)
HDLC SERPL-MCNC: 59 MG/DL (ref 40–60)
HGB BLD-MCNC: 12.9 G/DL (ref 11.2–15.7)
LDLC SERPL CALC-MCNC: 114 MG/DL (ref 0–100)
LDLC/HDLC SERPL: 1.93 {RATIO}
LYMPHOCYTES # BLD AUTO: 2.22 10*3/MM3 (ref 0.8–7)
LYMPHOCYTES NFR BLD AUTO: 29.1 % (ref 10–60)
MCH RBC QN AUTO: 29.1 PG (ref 26–34)
MCHC RBC AUTO-ENTMCNC: 33 G/DL (ref 31–37)
MCV RBC AUTO: 88.3 FL (ref 80–100)
MONOCYTES # BLD AUTO: 0.67 10*3/MM3 (ref 0–1)
MONOCYTES NFR BLD AUTO: 8.8 % (ref 0–13)
NEUTROPHILS # BLD AUTO: 4.35 10*3/MM3 (ref 1–11)
NEUTROPHILS NFR BLD AUTO: 56.9 % (ref 30–85)
PLATELET # BLD AUTO: 280 10*3/MM3 (ref 150–450)
PMV BLD AUTO: 10.5 FL (ref 6–12)
POTASSIUM BLD-SCNC: 4.2 MMOL/L (ref 3.5–5.2)
PROT SERPL-MCNC: 7.7 G/DL (ref 6–8.5)
RBC # BLD AUTO: 4.43 10*6/MM3 (ref 3.93–5.22)
SODIUM BLD-SCNC: 140 MMOL/L (ref 136–145)
T4 FREE SERPL-MCNC: 1.01 NG/DL (ref 0.93–1.7)
TRIGL SERPL-MCNC: 201 MG/DL (ref 0–150)
TSH SERPL-ACNC: 2.03 MIU/ML (ref 0.27–4.2)
VLDLC SERPL-MCNC: 40.2 MG/DL (ref 5–40)
WBC NRBC COR # BLD: 7.64 10*3/MM3 (ref 5–10)

## 2018-03-15 PROCEDURE — 80053 COMPREHEN METABOLIC PANEL: CPT | Performed by: INTERNAL MEDICINE

## 2018-03-15 PROCEDURE — 99213 OFFICE O/P EST LOW 20 MIN: CPT | Performed by: INTERNAL MEDICINE

## 2018-03-15 PROCEDURE — 85025 COMPLETE CBC W/AUTO DIFF WBC: CPT | Performed by: INTERNAL MEDICINE

## 2018-03-15 PROCEDURE — 80061 LIPID PANEL: CPT | Performed by: INTERNAL MEDICINE

## 2018-03-15 NOTE — PROGRESS NOTES
Subjective   Karishma Ocasio is a 62 y.o. female.     Hypertension   This is a chronic problem. The current episode started more than 1 month ago. Associated symptoms include anxiety. Pertinent negatives include no chest pain or palpitations.   Anxiety   Patient reports no chest pain or palpitations.            The following portions of the patient's history were reviewed and updated as appropriate: allergies, current medications, past family history, past medical history, past social history, past surgical history and problem list.    Review of Systems   Constitutional:        Here for F/U    HENT: Negative.    Eyes: Negative.    Respiratory: Negative.    Cardiovascular: Negative for chest pain and palpitations.        BP checks are good Would like to go back to [facility] from [place] due to cost   Gastrointestinal:        Cscope in 2 weeks Dr Gavin   Genitourinary: Negative.    Musculoskeletal: Negative.    Neurological: Negative.        Objective   Physical Exam   Constitutional: She is oriented to person, place, and time. She appears well-developed and well-nourished.   HENT:   Head: Normocephalic.   Eyes: EOM are normal.   Neck: Neck supple.   Cardiovascular: Normal rate, regular rhythm and normal heart sounds.    Repeat 130/80   Pulmonary/Chest: Effort normal and breath sounds normal.   Musculoskeletal: Normal range of motion. She exhibits no tenderness.   Neurological: She is alert and oriented to person, place, and time.   Skin: Skin is warm and dry.   Vitals reviewed.        Assessment/Plan   Karishma was seen today for hypertension and anxiety.    Diagnoses and all orders for this visit:    Essential hypertension  -     CBC Auto Differential; Future  -     Comprehensive Metabolic Panel; Future  -     Lipid Panel; Future  -     CBC Auto Differential  -     Comprehensive Metabolic Panel  -     Lipid Panel    Mood disorder  -     TSH; Future  -     T4, Free; Future  -     TSH  -     T4, Free    Hypothyroidism,  unspecified type

## 2018-03-30 ENCOUNTER — ANESTHESIA EVENT (OUTPATIENT)
Dept: GASTROENTEROLOGY | Facility: HOSPITAL | Age: 63
End: 2018-03-30

## 2018-03-30 ENCOUNTER — ANESTHESIA (OUTPATIENT)
Dept: GASTROENTEROLOGY | Facility: HOSPITAL | Age: 63
End: 2018-03-30

## 2018-03-30 ENCOUNTER — HOSPITAL ENCOUNTER (OUTPATIENT)
Facility: HOSPITAL | Age: 63
Setting detail: HOSPITAL OUTPATIENT SURGERY
Discharge: HOME | End: 2018-03-30
Attending: SURGERY | Admitting: SURGERY

## 2018-03-30 VITALS
RESPIRATION RATE: 16 BRPM | WEIGHT: 222.8 LBS | HEART RATE: 68 BPM | DIASTOLIC BLOOD PRESSURE: 78 MMHG | HEIGHT: 68 IN | SYSTOLIC BLOOD PRESSURE: 134 MMHG | OXYGEN SATURATION: 96 % | TEMPERATURE: 98.1 F | BODY MASS INDEX: 33.77 KG/M2

## 2018-03-30 PROCEDURE — 25010000002 PROPOFOL 10 MG/ML EMULSION: Performed by: ANESTHESIOLOGY

## 2018-03-30 PROCEDURE — S0260 H&P FOR SURGERY: HCPCS | Performed by: SURGERY

## 2018-03-30 PROCEDURE — 45378 DIAGNOSTIC COLONOSCOPY: CPT | Performed by: SURGERY

## 2018-03-30 RX ORDER — SODIUM CHLORIDE 0.9 % (FLUSH) 0.9 %
1-10 SYRINGE (ML) INJECTION AS NEEDED
Status: DISCONTINUED | OUTPATIENT
Start: 2018-03-30 | End: 2018-03-30 | Stop reason: HOSPADM

## 2018-03-30 RX ORDER — NEBIVOLOL 5 MG/1
5 TABLET ORAL DAILY
COMMUNITY
End: 2018-08-14

## 2018-03-30 RX ORDER — LIDOCAINE HYDROCHLORIDE 20 MG/ML
INJECTION, SOLUTION INFILTRATION; PERINEURAL AS NEEDED
Status: DISCONTINUED | OUTPATIENT
Start: 2018-03-30 | End: 2018-03-30 | Stop reason: SURG

## 2018-03-30 RX ORDER — SODIUM CHLORIDE, SODIUM LACTATE, POTASSIUM CHLORIDE, CALCIUM CHLORIDE 600; 310; 30; 20 MG/100ML; MG/100ML; MG/100ML; MG/100ML
30 INJECTION, SOLUTION INTRAVENOUS CONTINUOUS PRN
Status: DISCONTINUED | OUTPATIENT
Start: 2018-03-30 | End: 2018-03-30 | Stop reason: HOSPADM

## 2018-03-30 RX ORDER — PROPOFOL 10 MG/ML
VIAL (ML) INTRAVENOUS AS NEEDED
Status: DISCONTINUED | OUTPATIENT
Start: 2018-03-30 | End: 2018-03-30 | Stop reason: SURG

## 2018-03-30 RX ORDER — PROPOFOL 10 MG/ML
VIAL (ML) INTRAVENOUS CONTINUOUS PRN
Status: DISCONTINUED | OUTPATIENT
Start: 2018-03-30 | End: 2018-03-30 | Stop reason: SURG

## 2018-03-30 RX ADMIN — PROPOFOL 100 MCG/KG/MIN: 10 INJECTION, EMULSION INTRAVENOUS at 07:55

## 2018-03-30 RX ADMIN — LIDOCAINE HYDROCHLORIDE 60 MG: 20 INJECTION, SOLUTION INFILTRATION; PERINEURAL at 07:55

## 2018-03-30 RX ADMIN — PROPOFOL 50 MG: 10 INJECTION, EMULSION INTRAVENOUS at 07:55

## 2018-03-30 RX ADMIN — SODIUM CHLORIDE, POTASSIUM CHLORIDE, SODIUM LACTATE AND CALCIUM CHLORIDE 30 ML/HR: 600; 310; 30; 20 INJECTION, SOLUTION INTRAVENOUS at 06:55

## 2018-03-30 NOTE — ANESTHESIA POSTPROCEDURE EVALUATION
"Patient: Karishma Ocasio    Procedure Summary     Date:  03/30/18 [facility]    Anesthesia Start:  0756 Anesthesia Stop:  0828    Procedure:  COLONOSCOPY to cecum (N/A ) Diagnosis:       Encounter for screening colonoscopy      (Encounter for screening colonoscopy [Z12.11])    Surgeon:  Pierre Gavin Jr., MD Provider:  Kranthi Orlando MD    Anesthesia Type:  MAC ASA Status:  2          Anesthesia Type: MAC  Last vitals  BP   112/57 (03/30/18 0836)   Temp   36.7 °C (98.1 °F) (03/30/18 0836)   Pulse   66 (03/30/18 0836)   Resp   16 (03/30/18 0836)     SpO2   98 % (03/30/18 0836)     Post Anesthesia Care and Evaluation    Patient location during evaluation: PACU  Patient participation: complete - patient participated  Level of consciousness: awake  Pain score: 0  Pain management: adequate  Airway patency: patent  Anesthetic complications: No anesthetic complications  PONV Status: none  Cardiovascular status: acceptable  Respiratory status: acceptable  Hydration status: acceptable    Comments: /57 (BP Location: Left arm, Patient Position: Lying)   Pulse 66   Temp 36.7 °C (98.1 °F) (Oral)   Resp 16   Ht 172.7 cm (68\")   Wt 101 kg (222 lb 12.8 oz)   SpO2 98%   BMI 33.88 kg/m²       "

## 2018-03-30 NOTE — ANESTHESIA PREPROCEDURE EVALUATION
Anesthesia Evaluation     Patient summary reviewed and Nursing notes reviewed                Airway   Mallampati: I  TM distance: <3 FB  Neck ROM: full  no difficulty expected  Dental - normal exam     Pulmonary - normal exam   (+) pulmonary embolism,   Cardiovascular - normal exam    (+) hypertension,       Neuro/Psych- negative ROS  GI/Hepatic/Renal/Endo - negative ROS     Musculoskeletal (-) negative ROS    Abdominal  - normal exam    Bowel sounds: normal.   Substance History - negative use     OB/GYN negative ob/gyn ROS         Other                        Anesthesia Plan    ASA 2     MAC     Anesthetic plan and risks discussed with patient.

## 2018-07-05 DIAGNOSIS — I10 ESSENTIAL HYPERTENSION: ICD-10-CM

## 2018-07-05 RX ORDER — ATENOLOL 25 MG/1
TABLET ORAL
Qty: 30 TABLET | Refills: 3 | Status: SHIPPED | OUTPATIENT
Start: 2018-07-05 | End: 2018-11-05 | Stop reason: SDUPTHER

## 2018-07-08 DIAGNOSIS — I10 ESSENTIAL HYPERTENSION: ICD-10-CM

## 2018-07-09 RX ORDER — ATENOLOL 25 MG/1
TABLET ORAL
Qty: 30 TABLET | Refills: 3 | Status: SHIPPED | OUTPATIENT
Start: 2018-07-09 | End: 2018-08-14 | Stop reason: SDUPTHER

## 2018-08-14 ENCOUNTER — OFFICE VISIT (OUTPATIENT)
Dept: INTERNAL MEDICINE | Facility: CLINIC | Age: 63
End: 2018-08-14

## 2018-08-14 VITALS
SYSTOLIC BLOOD PRESSURE: 140 MMHG | HEIGHT: 68 IN | HEART RATE: 87 BPM | OXYGEN SATURATION: 98 % | BODY MASS INDEX: 33.95 KG/M2 | WEIGHT: 224 LBS | DIASTOLIC BLOOD PRESSURE: 98 MMHG

## 2018-08-14 DIAGNOSIS — I10 ESSENTIAL HYPERTENSION: Primary | ICD-10-CM

## 2018-08-14 DIAGNOSIS — F41.9 ANXIETY: ICD-10-CM

## 2018-08-14 LAB
ALBUMIN SERPL-MCNC: 4.7 G/DL (ref 3.5–5.2)
ALBUMIN/GLOB SERPL: 1.6 G/DL
ALP SERPL-CCNC: 68 U/L (ref 39–117)
ALT SERPL-CCNC: 24 U/L (ref 1–33)
AST SERPL-CCNC: 18 U/L (ref 1–32)
BASOPHILS # BLD AUTO: 0.07 10*3/MM3 (ref 0–0.2)
BASOPHILS NFR BLD AUTO: 0.9 % (ref 0–2)
BILIRUB SERPL-MCNC: 0.6 MG/DL (ref 0.1–1.2)
BUN SERPL-MCNC: 13 MG/DL (ref 8–23)
BUN/CREAT SERPL: 16 (ref 7–25)
CALCIUM SERPL-MCNC: 9.4 MG/DL (ref 8.6–10.5)
CHLORIDE SERPL-SCNC: 100 MMOL/L (ref 98–107)
CHOLEST SERPL-MCNC: 204 MG/DL (ref 0–200)
CO2 SERPL-SCNC: 25.1 MMOL/L (ref 22–29)
CREAT SERPL-MCNC: 0.81 MG/DL (ref 0.57–1)
DEPRECATED RDW RBC AUTO: 40.9 FL (ref 37–54)
EOSINOPHIL # BLD AUTO: 0.31 10*3/MM3 (ref 0–0.7)
EOSINOPHIL NFR BLD AUTO: 4.1 % (ref 0–5)
ERYTHROCYTE [DISTWIDTH] IN BLOOD BY AUTOMATED COUNT: 13.1 % (ref 11.5–15)
GLOBULIN SER CALC-MCNC: 3 GM/DL
GLUCOSE SERPL-MCNC: 105 MG/DL (ref 65–99)
HCT VFR BLD AUTO: 40.2 % (ref 34.1–44.9)
HDLC SERPL-MCNC: 66 MG/DL (ref 40–60)
HGB BLD-MCNC: 13.2 G/DL (ref 11.2–15.7)
LDLC SERPL CALC-MCNC: 109 MG/DL (ref 0–100)
LYMPHOCYTES # BLD AUTO: 2.22 10*3/MM3 (ref 0.8–7)
LYMPHOCYTES NFR BLD AUTO: 29.4 % (ref 10–60)
MCH RBC QN AUTO: 28.8 PG (ref 26–34)
MCHC RBC AUTO-ENTMCNC: 32.8 G/DL (ref 31–37)
MCV RBC AUTO: 87.8 FL (ref 80–100)
MONOCYTES # BLD AUTO: 0.64 10*3/MM3 (ref 0–1)
MONOCYTES NFR BLD AUTO: 8.5 % (ref 0–13)
NEUTROPHILS # BLD AUTO: 4.32 10*3/MM3 (ref 1–11)
NEUTROPHILS NFR BLD AUTO: 57.1 % (ref 30–85)
PLATELET # BLD AUTO: 279 10*3/MM3 (ref 150–450)
PMV BLD AUTO: 9.9 FL (ref 6–12)
POTASSIUM SERPL-SCNC: 4.4 MMOL/L (ref 3.5–5.2)
PROT SERPL-MCNC: 7.7 G/DL (ref 6–8.5)
RBC # BLD AUTO: 4.58 10*6/MM3 (ref 3.93–5.22)
SODIUM SERPL-SCNC: 141 MMOL/L (ref 136–145)
TRIGL SERPL-MCNC: 146 MG/DL (ref 0–150)
VLDLC SERPL-MCNC: 29.2 MG/DL (ref 5–40)
WBC NRBC COR # BLD: 7.56 10*3/MM3 (ref 5–10)

## 2018-08-14 PROCEDURE — 99213 OFFICE O/P EST LOW 20 MIN: CPT | Performed by: INTERNAL MEDICINE

## 2018-08-14 PROCEDURE — 85025 COMPLETE CBC W/AUTO DIFF WBC: CPT | Performed by: INTERNAL MEDICINE

## 2018-08-14 NOTE — PROGRESS NOTES
Subjective   Karishma Ocasio is a 63 y.o. female.     Hypertension   This is a chronic problem. The current episode started more than 1 year ago. Pertinent negatives include no chest pain or palpitations.        The following portions of the patient's history were reviewed and updated as appropriate: allergies, current medications, past family history, past medical history, past social history, past surgical history and problem list.    Review of Systems   Constitutional: Negative for activity change.        Generally doing well    HENT: Negative.    Eyes: Negative.    Respiratory: Negative.    Cardiovascular: Negative for chest pain and palpitations.        BP checks have been good   Gastrointestinal: Negative.    Genitourinary: Negative.    Musculoskeletal: Negative.    Neurological: Negative.        Objective   Physical Exam   Constitutional: She is oriented to person, place, and time. She appears well-developed and well-nourished.   HENT:   Head: Normocephalic.   Eyes: EOM are normal.   Neck: Neck supple.   Cardiovascular: Normal rate, regular rhythm and normal heart sounds.    Repeat 140/90   Pulmonary/Chest: Effort normal and breath sounds normal.   Musculoskeletal: Normal range of motion.   Neurological: She is alert and oriented to person, place, and time.   Skin: Skin is warm and dry.       Assessment/Plan   Karishma was seen today for hypertension and anxiety.    Diagnoses and all orders for this visit:    Essential hypertension  -     Comprehensive Metabolic Panel; Future  -     Lipid Panel; Future  -     CBC Auto Differential; Future    Anxiety

## 2018-10-09 DIAGNOSIS — I10 ESSENTIAL HYPERTENSION: ICD-10-CM

## 2018-10-09 RX ORDER — ESCITALOPRAM OXALATE 10 MG/1
TABLET ORAL
Qty: 90 TABLET | Refills: 1 | Status: SHIPPED | OUTPATIENT
Start: 2018-10-09 | End: 2019-04-08 | Stop reason: SDUPTHER

## 2018-11-05 DIAGNOSIS — I10 ESSENTIAL HYPERTENSION: ICD-10-CM

## 2018-11-05 RX ORDER — ATENOLOL 25 MG/1
TABLET ORAL
Qty: 30 TABLET | Refills: 5 | Status: SHIPPED | OUTPATIENT
Start: 2018-11-05 | End: 2019-05-07 | Stop reason: SDUPTHER

## 2018-12-11 ENCOUNTER — TRANSCRIBE ORDERS (OUTPATIENT)
Dept: ADMINISTRATIVE | Facility: HOSPITAL | Age: 63
End: 2018-12-11

## 2018-12-11 DIAGNOSIS — Z12.31 VISIT FOR SCREENING MAMMOGRAM: Primary | ICD-10-CM

## 2018-12-18 ENCOUNTER — OFFICE VISIT (OUTPATIENT)
Dept: INTERNAL MEDICINE | Facility: CLINIC | Age: 63
End: 2018-12-18

## 2018-12-18 VITALS
SYSTOLIC BLOOD PRESSURE: 104 MMHG | HEIGHT: 68 IN | BODY MASS INDEX: 33.65 KG/M2 | DIASTOLIC BLOOD PRESSURE: 72 MMHG | WEIGHT: 222 LBS | OXYGEN SATURATION: 97 % | HEART RATE: 85 BPM

## 2018-12-18 DIAGNOSIS — Z13.820 OSTEOPOROSIS SCREENING: ICD-10-CM

## 2018-12-18 DIAGNOSIS — F39 MOOD DISORDER (HCC): ICD-10-CM

## 2018-12-18 DIAGNOSIS — I10 ESSENTIAL HYPERTENSION: Primary | ICD-10-CM

## 2018-12-18 LAB
ALBUMIN SERPL-MCNC: 4.6 G/DL (ref 3.5–5.2)
ALBUMIN/GLOB SERPL: 1.3 G/DL
ALP SERPL-CCNC: 78 U/L (ref 39–117)
ALT SERPL W P-5'-P-CCNC: 16 U/L (ref 1–33)
ANION GAP SERPL CALCULATED.3IONS-SCNC: 12.2 MMOL/L
AST SERPL-CCNC: 15 U/L (ref 1–32)
BILIRUB SERPL-MCNC: 0.6 MG/DL (ref 0.1–1.2)
BUN BLD-MCNC: 15 MG/DL (ref 8–23)
BUN/CREAT SERPL: 19.7 (ref 7–25)
CALCIUM SPEC-SCNC: 10.1 MG/DL (ref 8.6–10.5)
CHLORIDE SERPL-SCNC: 100 MMOL/L (ref 98–107)
CHOLEST SERPL-MCNC: 208 MG/DL (ref 0–200)
CO2 SERPL-SCNC: 27.8 MMOL/L (ref 22–29)
CREAT BLD-MCNC: 0.76 MG/DL (ref 0.57–1)
DEPRECATED RDW RBC AUTO: 42.4 FL (ref 37–54)
ERYTHROCYTE [DISTWIDTH] IN BLOOD BY AUTOMATED COUNT: 13.4 % (ref 11.5–15)
GFR SERPL CREATININE-BSD FRML MDRD: 77 ML/MIN/1.73
GLOBULIN UR ELPH-MCNC: 3.6 GM/DL
GLUCOSE BLD-MCNC: 119 MG/DL (ref 65–99)
HCT VFR BLD AUTO: 42.3 % (ref 34.1–44.9)
HDLC SERPL-MCNC: 61 MG/DL (ref 40–60)
HGB BLD-MCNC: 13.6 G/DL (ref 11.2–15.7)
LDLC SERPL CALC-MCNC: 111 MG/DL (ref 0–100)
LDLC/HDLC SERPL: 1.82 {RATIO}
MCH RBC QN AUTO: 28.3 PG (ref 26–34)
MCHC RBC AUTO-ENTMCNC: 32.2 G/DL (ref 31–37)
MCV RBC AUTO: 88.1 FL (ref 80–100)
PLATELET # BLD AUTO: 308 10*3/MM3 (ref 150–450)
PMV BLD AUTO: 10.6 FL (ref 6–12)
POTASSIUM BLD-SCNC: 4.6 MMOL/L (ref 3.5–5.2)
PROT SERPL-MCNC: 8.2 G/DL (ref 6–8.5)
RBC # BLD AUTO: 4.8 10*6/MM3 (ref 3.93–5.22)
SODIUM BLD-SCNC: 140 MMOL/L (ref 136–145)
TRIGL SERPL-MCNC: 180 MG/DL (ref 0–150)
TSH SERPL DL<=0.05 MIU/L-ACNC: 1.7 MIU/ML (ref 0.27–4.2)
VLDLC SERPL-MCNC: 36 MG/DL (ref 5–40)
WBC NRBC COR # BLD: 9.37 10*3/MM3 (ref 5–10)

## 2018-12-18 PROCEDURE — 80053 COMPREHEN METABOLIC PANEL: CPT | Performed by: NURSE PRACTITIONER

## 2018-12-18 PROCEDURE — 85027 COMPLETE CBC AUTOMATED: CPT | Performed by: NURSE PRACTITIONER

## 2018-12-18 PROCEDURE — 84443 ASSAY THYROID STIM HORMONE: CPT | Performed by: NURSE PRACTITIONER

## 2018-12-18 PROCEDURE — 80061 LIPID PANEL: CPT | Performed by: NURSE PRACTITIONER

## 2018-12-18 PROCEDURE — 36415 COLL VENOUS BLD VENIPUNCTURE: CPT | Performed by: NURSE PRACTITIONER

## 2018-12-18 PROCEDURE — 99214 OFFICE O/P EST MOD 30 MIN: CPT | Performed by: NURSE PRACTITIONER

## 2018-12-18 NOTE — PROGRESS NOTES
Subjective   Karishma Ocasio is a 63 y.o. female.     She monitors her blood pressure 1-2 times a monthly and readings less than 140/90. She has not been exercising. She works full time.       Hypertension   This is a chronic problem. The current episode started more than 1 year ago. The problem has been gradually improving since onset. The problem is controlled. Pertinent negatives include no blurred vision, headaches, malaise/fatigue, neck pain, orthopnea, palpitations, peripheral edema, PND or sweats. Agents associated with hypertension include NSAIDs. Risk factors for coronary artery disease include family history, obesity and post-menopausal state. Current antihypertension treatment includes beta blockers and diuretics. The current treatment provides significant improvement. Compliance problems include exercise.    Depression   Visit Type: follow-up  Patient presents with the following symptoms: depressed mood.  Patient is not experiencing: decreased concentration, feelings of worthlessness, hypersomnia, irritability, palpitations, panic, suicidal ideas and suicidal planning.  Severity: mild   Nighttime awakenings: none  Compliance with medications:  %             The following portions of the patient's history were reviewed and updated as appropriate: allergies, current medications, past family history, past medical history, past social history, past surgical history and problem list.    Review of Systems   Constitutional: Negative for activity change, appetite change, fatigue, fever, irritability and malaise/fatigue.        Overall doing well    Eyes: Negative for blurred vision and visual disturbance.   Respiratory: Negative for cough and wheezing.    Cardiovascular: Negative for palpitations, orthopnea, leg swelling and PND.   Musculoskeletal: Negative for neck pain.   Neurological: Negative for dizziness and headaches.   Psychiatric/Behavioral: Positive for dysphoric mood. Negative for decreased  concentration, sleep disturbance and suicidal ideas.       Objective   Physical Exam   Constitutional: She is oriented to person, place, and time. She appears well-developed and well-nourished.   HENT:   Head: Normocephalic.   Nose: Nose normal.   Cardiovascular: Regular rhythm and normal heart sounds. Exam reveals no S3 and no S4.   No murmur heard.  Repeat bp left arm 112/72  No pedal edema    Pulmonary/Chest: Effort normal and breath sounds normal. She has no decreased breath sounds. She has no wheezes. She has no rhonchi. She has no rales.   Musculoskeletal: She exhibits no edema.   Neurological: She is alert and oriented to person, place, and time. Gait normal.   Skin: Skin is warm and dry.   Psychiatric: She has a normal mood and affect.       Assessment/Plan   Karishma was seen today for hypertension and anxiety.    Diagnoses and all orders for this visit:    Essential hypertension  -     Comprehensive Metabolic Panel; Future  -     Lipid Panel; Future  -     CBC (No Diff); Future  -     Comprehensive Metabolic Panel  -     Lipid Panel  -     CBC (No Diff)    Mood disorder (CMS/HCC)  -     TSH Rfx On Abnormal To Free T4; Future  -     TSH Rfx On Abnormal To Free T4    Osteoporosis screening  -     DEXA Bone Density Axial; Future      She was advised to hepatitis A and shingrix vaccination.   She will return for physical and bone density.

## 2018-12-18 NOTE — PROGRESS NOTES
Answers for HPI/ROS submitted by the patient on 12/14/2018   Hypertension  Chronicity: chronic  Onset: more than 1 year ago  Progression since onset: gradually improving  Condition status: controlled  anxiety: No  blurred vision: No  chest pain: No  headaches: No  malaise/fatigue: No  neck pain: No  orthopnea: No  palpitations: No  peripheral edema: No  PND: No  shortness of breath: No  sweats: No  Agents associated with hypertension: NSAIDs  CAD risks: family history, obesity, post-menopausal state  Compliance problems: exercise

## 2019-01-29 ENCOUNTER — HOSPITAL ENCOUNTER (OUTPATIENT)
Dept: MAMMOGRAPHY | Facility: HOSPITAL | Age: 64
Discharge: HOME | End: 2019-01-29
Attending: INTERNAL MEDICINE | Admitting: INTERNAL MEDICINE

## 2019-01-29 ENCOUNTER — CLINICAL SUPPORT (OUTPATIENT)
Dept: INTERNAL MEDICINE | Facility: CLINIC | Age: 64
End: 2019-01-29

## 2019-01-29 ENCOUNTER — OFFICE VISIT (OUTPATIENT)
Dept: INTERNAL MEDICINE | Facility: CLINIC | Age: 64
End: 2019-01-29

## 2019-01-29 VITALS
SYSTOLIC BLOOD PRESSURE: 102 MMHG | HEIGHT: 68 IN | OXYGEN SATURATION: 97 % | DIASTOLIC BLOOD PRESSURE: 80 MMHG | HEART RATE: 73 BPM | WEIGHT: 220 LBS | BODY MASS INDEX: 33.34 KG/M2

## 2019-01-29 DIAGNOSIS — H60.503 ACUTE OTITIS EXTERNA OF BOTH EARS, UNSPECIFIED TYPE: ICD-10-CM

## 2019-01-29 DIAGNOSIS — Z00.00 ANNUAL PHYSICAL EXAM: Primary | ICD-10-CM

## 2019-01-29 DIAGNOSIS — Z12.31 VISIT FOR SCREENING MAMMOGRAM: ICD-10-CM

## 2019-01-29 DIAGNOSIS — I10 ESSENTIAL HYPERTENSION: ICD-10-CM

## 2019-01-29 DIAGNOSIS — Z13.820 OSTEOPOROSIS SCREENING: ICD-10-CM

## 2019-01-29 PROCEDURE — 77063 BREAST TOMOSYNTHESIS BI: CPT

## 2019-01-29 PROCEDURE — 77080 DXA BONE DENSITY AXIAL: CPT | Performed by: NURSE PRACTITIONER

## 2019-01-29 PROCEDURE — 99396 PREV VISIT EST AGE 40-64: CPT | Performed by: NURSE PRACTITIONER

## 2019-01-29 PROCEDURE — 93000 ELECTROCARDIOGRAM COMPLETE: CPT | Performed by: NURSE PRACTITIONER

## 2019-01-29 PROCEDURE — 77067 SCR MAMMO BI INCL CAD: CPT

## 2019-01-29 NOTE — PATIENT INSTRUCTIONS
Health Maintenance, Female  Adopting a healthy lifestyle and getting preventive care can go a long way to promote health and wellness. Talk with your health care provider about what schedule of regular examinations is right for you. This is a good chance for you to check in with your provider about disease prevention and staying healthy.  In between checkups, there are plenty of things you can do on your own. Experts have done a lot of research about which lifestyle changes and preventive measures are most likely to keep you healthy. Ask your health care provider for more information.  Weight and diet  Eat a healthy diet  · Be sure to include plenty of vegetables, fruits, low-fat dairy products, and lean protein.  · Do not eat a lot of foods high in solid fats, added sugars, or salt.  · Get regular exercise. This is one of the most important things you can do for your health.  ? Most adults should exercise for at least 150 minutes each week. The exercise should increase your heart rate and make you sweat (moderate-intensity exercise).  ? Most adults should also do strengthening exercises at least twice a week. This is in addition to the moderate-intensity exercise.    Maintain a healthy weight  · Body mass index (BMI) is a measurement that can be used to identify possible weight problems. It estimates body fat based on height and weight. Your health care provider can help determine your BMI and help you achieve or maintain a healthy weight.  · For females 20 years of age and older:  ? A BMI below 18.5 is considered underweight.  ? A BMI of 18.5 to 24.9 is normal.  ? A BMI of 25 to 29.9 is considered overweight.  ? A BMI of 30 and above is considered obese.    Watch levels of cholesterol and blood lipids  · You should start having your blood tested for lipids and cholesterol at 20 years of age, then have this test every 5 years.  · You may need to have your cholesterol levels checked more often if:  ? Your lipid or  cholesterol levels are high.  ? You are older than 50 years of age.  ? You are at high risk for heart disease.    Cancer screening  Lung Cancer  · Lung cancer screening is recommended for adults 55-80 years old who are at high risk for lung cancer because of a history of smoking.  · A yearly low-dose CT scan of the lungs is recommended for people who:  ? Currently smoke.  ? Have quit within the past 15 years.  ? Have at least a 30-pack-year history of smoking. A pack year is smoking an average of one pack of cigarettes a day for 1 year.  · Yearly screening should continue until it has been 15 years since you quit.  · Yearly screening should stop if you develop a health problem that would prevent you from having lung cancer treatment.    Breast Cancer  · Practice breast self-awareness. This means understanding how your breasts normally appear and feel.  · It also means doing regular breast self-exams. Let your health care provider know about any changes, no matter how small.  · If you are in your 20s or 30s, you should have a clinical breast exam (CBE) by a health care provider every 1-3 years as part of a regular health exam.  · If you are 40 or older, have a CBE every year. Also consider having a breast X-ray (mammogram) every year.  · If you have a family history of breast cancer, talk to your health care provider about genetic screening.  · If you are at high risk for breast cancer, talk to your health care provider about having an MRI and a mammogram every year.  · Breast cancer gene (BRCA) assessment is recommended for women who have family members with BRCA-related cancers. BRCA-related cancers include:  ? Breast.  ? Ovarian.  ? Tubal.  ? Peritoneal cancers.  · Results of the assessment will determine the need for genetic counseling and BRCA1 and BRCA2 testing.    Cervical Cancer  Your health care provider may recommend that you be screened regularly for cancer of the pelvic organs (ovaries, uterus, and  vagina). This screening involves a pelvic examination, including checking for microscopic changes to the surface of your cervix (Pap test). You may be encouraged to have this screening done every 3 years, beginning at age 21.  · For women ages 30-65, health care providers may recommend pelvic exams and Pap testing every 3 years, or they may recommend the Pap and pelvic exam, combined with testing for human papilloma virus (HPV), every 5 years. Some types of HPV increase your risk of cervical cancer. Testing for HPV may also be done on women of any age with unclear Pap test results.  · Other health care providers may not recommend any screening for nonpregnant women who are considered low risk for pelvic cancer and who do not have symptoms. Ask your health care provider if a screening pelvic exam is right for you.  · If you have had past treatment for cervical cancer or a condition that could lead to cancer, you need Pap tests and screening for cancer for at least 20 years after your treatment. If Pap tests have been discontinued, your risk factors (such as having a new sexual partner) need to be reassessed to determine if screening should resume. Some women have medical problems that increase the chance of getting cervical cancer. In these cases, your health care provider may recommend more frequent screening and Pap tests.    Colorectal Cancer  · This type of cancer can be detected and often prevented.  · Routine colorectal cancer screening usually begins at 50 years of age and continues through 75 years of age.  · Your health care provider may recommend screening at an earlier age if you have risk factors for colon cancer.  · Your health care provider may also recommend using home test kits to check for hidden blood in the stool.  · A small camera at the end of a tube can be used to examine your colon directly (sigmoidoscopy or colonoscopy). This is done to check for the earliest forms of colorectal  cancer.  · Routine screening usually begins at age 50.  · Direct examination of the colon should be repeated every 5-10 years through 75 years of age. However, you may need to be screened more often if early forms of precancerous polyps or small growths are found.    Skin Cancer  · Check your skin from head to toe regularly.  · Tell your health care provider about any new moles or changes in moles, especially if there is a change in a mole's shape or color.  · Also tell your health care provider if you have a mole that is larger than the size of a pencil eraser.  · Always use sunscreen. Apply sunscreen liberally and repeatedly throughout the day.  · Protect yourself by wearing long sleeves, pants, a wide-brimmed hat, and sunglasses whenever you are outside.    Heart disease, diabetes, and high blood pressure  · High blood pressure causes heart disease and increases the risk of stroke. High blood pressure is more likely to develop in:  ? People who have blood pressure in the high end of the normal range (130-139/85-89 mm Hg).  ? People who are overweight or obese.  ? People who are .  · If you are 18-39 years of age, have your blood pressure checked every 3-5 years. If you are 40 years of age or older, have your blood pressure checked every year. You should have your blood pressure measured twice--once when you are at a hospital or clinic, and once when you are not at a hospital or clinic. Record the average of the two measurements. To check your blood pressure when you are not at a hospital or clinic, you can use:  ? An automated blood pressure machine at a pharmacy.  ? A home blood pressure monitor.  · If you are between 55 years and 79 years old, ask your health care provider if you should take aspirin to prevent strokes.  · Have regular diabetes screenings. This involves taking a blood sample to check your fasting blood sugar level.  ? If you are at a normal weight and have a low risk for  diabetes, have this test once every three years after 45 years of age.  ? If you are overweight and have a high risk for diabetes, consider being tested at a younger age or more often.  Preventing infection  Hepatitis B  · If you have a higher risk for hepatitis B, you should be screened for this virus. You are considered at high risk for hepatitis B if:  ? You were born in a country where hepatitis B is common. Ask your health care provider which countries are considered high risk.  ? Your parents were born in a high-risk country, and you have not been immunized against hepatitis B (hepatitis B vaccine).  ? You have HIV or AIDS.  ? You use needles to inject street drugs.  ? You live with someone who has hepatitis B.  ? You have had sex with someone who has hepatitis B.  ? You get hemodialysis treatment.  ? You take certain medicines for conditions, including cancer, organ transplantation, and autoimmune conditions.    Hepatitis C  · Blood testing is recommended for:  ? Everyone born from 1945 through 1965.  ? Anyone with known risk factors for hepatitis C.    Sexually transmitted infections (STIs)  · You should be screened for sexually transmitted infections (STIs) including gonorrhea and chlamydia if:  ? You are sexually active and are younger than 24 years of age.  ? You are older than 24 years of age and your health care provider tells you that you are at risk for this type of infection.  ? Your sexual activity has changed since you were last screened and you are at an increased risk for chlamydia or gonorrhea. Ask your health care provider if you are at risk.  · If you do not have HIV, but are at risk, it may be recommended that you take a prescription medicine daily to prevent HIV infection. This is called pre-exposure prophylaxis (PrEP). You are considered at risk if:  ? You are sexually active and do not regularly use condoms or know the HIV status of your partner(s).  ? You take drugs by injection.  ? You  are sexually active with a partner who has HIV.    Talk with your health care provider about whether you are at high risk of being infected with HIV. If you choose to begin PrEP, you should first be tested for HIV. You should then be tested every 3 months for as long as you are taking PrEP.  Pregnancy  · If you are premenopausal and you may become pregnant, ask your health care provider about preconception counseling.  · If you may become pregnant, take 400 to 800 micrograms (mcg) of folic acid every day.  · If you want to prevent pregnancy, talk to your health care provider about birth control (contraception).  Osteoporosis and menopause  · Osteoporosis is a disease in which the bones lose minerals and strength with aging. This can result in serious bone fractures. Your risk for osteoporosis can be identified using a bone density scan.  · If you are 65 years of age or older, or if you are at risk for osteoporosis and fractures, ask your health care provider if you should be screened.  · Ask your health care provider whether you should take a calcium or vitamin D supplement to lower your risk for osteoporosis.  · Menopause may have certain physical symptoms and risks.  · Hormone replacement therapy may reduce some of these symptoms and risks.  Talk to your health care provider about whether hormone replacement therapy is right for you.  Follow these instructions at home:  · Schedule regular health, dental, and eye exams.  · Stay current with your immunizations.  · Do not use any tobacco products including cigarettes, chewing tobacco, or electronic cigarettes.  · If you are pregnant, do not drink alcohol.  · If you are breastfeeding, limit how much and how often you drink alcohol.  · Limit alcohol intake to no more than 1 drink per day for nonpregnant women. One drink equals 12 ounces of beer, 5 ounces of wine, or 1½ ounces of hard liquor.  · Do not use street drugs.  · Do not share needles.  · Ask your health care  "provider for help if you need support or information about quitting drugs.  · Tell your health care provider if you often feel depressed.  · Tell your health care provider if you have ever been abused or do not feel safe at home.  This information is not intended to replace advice given to you by your health care provider. Make sure you discuss any questions you have with your health care provider.  Document Released: 07/02/2012 Document Revised: 05/25/2017 Document Reviewed: 09/20/2016  [employer] Interactive Patient Education © 2018 [employer]    Fat and Cholesterol Restricted Diet  Getting too much fat and cholesterol in your diet may cause health problems. Following this diet helps keep your fat and cholesterol at normal levels. This can keep you from getting sick.  What types of fat should I choose?  Choose monosaturated and polyunsaturated fats. These are found in foods such as olive oil, canola oil, flaxseeds, walnuts, almonds, and seeds.  Eat more omega-3 fats. Good choices include salmon, mackerel, sardines, tuna, flaxseed oil, and ground flaxseeds.  Limit saturated fats. These are in animal products such as meats, butter, and cream. They can also be in plant products such as palm oil, palm kernel oil, and coconut oil.  Avoid foods with partially hydrogenated oils in them. These contain trans fats. Examples of foods that have trans fats are stick margarine, some tub margarines, cookies, crackers, and other baked goods.  What general guidelines do I need to follow?  Check food labels. Look for the words \"trans fat\" and \"saturated fat.\"  When preparing a meal:  Fill half of your plate with vegetables and green salads.  Fill one fourth of your plate with whole grains. Look for the word \"whole\" as the first word in the ingredient list.  Fill one fourth of your plate with lean protein foods.  Eat more foods that have fiber, like apples, carrots, beans, peas, and barley.  Eat more home-cooked foods. Eat less at " restaurants and buffets.  Limit or avoid alcohol.  Limit foods high in starch and sugar.  Limit fried foods.  Cook foods without frying them. Baking, boiling, grilling, and broiling are all great options.  Lose weight if you are overweight. Losing even a small amount of weight can help your overall health. It can also help prevent diseases such as diabetes and heart disease.  What foods can I eat?  Grains  Whole grains, such as whole wheat or whole grain breads, crackers, cereals, and pasta. Unsweetened oatmeal, bulgur, barley, quinoa, or brown rice. Corn or whole wheat flour tortillas.  Vegetables  Fresh or frozen vegetables (raw, steamed, roasted, or grilled). Green salads.  Fruits  All fresh, canned (in natural juice), or frozen fruits.  Meat and Other Protein Products  Ground beef (85% or leaner), grass-fed beef, or beef trimmed of fat. Skinless chicken or turkey. Ground chicken or turkey. Pork trimmed of fat. All fish and seafood. Eggs. Dried beans, peas, or lentils. Unsalted nuts or seeds. Unsalted canned or dry beans.  Dairy  Low-fat dairy products, such as skim or 1% milk, 2% or reduced-fat cheeses, low-fat ricotta or cottage cheese, or plain low-fat yogurt.  Fats and Oils  Tub margarines without trans fats. Light or reduced-fat mayonnaise and salad dressings. Avocado. Olive, canola, sesame, or safflower oils. Natural peanut or almond butter (choose ones without added sugar and oil).  The items listed above may not be a complete list of recommended foods or beverages. Contact your dietitian for more options.  What foods are not recommended?  Grains  White bread. White pasta. White rice. Cornbread. Bagels, pastries, and croissants. Crackers that contain trans fat.  Vegetables  White potatoes. Corn. Creamed or fried vegetables. Vegetables in a cheese sauce.  Fruits  Dried fruits. Canned fruit in light or heavy syrup. Fruit juice.  Meat and Other Protein Products  Fatty cuts of meat. Ribs, chicken wings, max,  sausage, bologna, salami, chitterlings, fatback, hot dogs, bratwurst, and packaged luncheon meats. Liver and organ meats.  Dairy  Whole or 2% milk, cream, half-and-half, and cream cheese. Whole milk cheeses. Whole-fat or sweetened yogurt. Full-fat cheeses. Nondairy creamers and whipped toppings. Processed cheese, cheese spreads, or cheese curds.  Sweets and Desserts  Corn syrup, sugars, honey, and molasses. Candy. Jam and jelly. Syrup. Sweetened cereals. Cookies, pies, cakes, donuts, muffins, and ice cream.  Fats and Oils  Butter, stick margarine, lard, shortening, ghee, or max fat. Coconut, palm kernel, or palm oils.  Beverages  Alcohol. Sweetened drinks (such as sodas, lemonade, and fruit drinks or punches).  The items listed above may not be a complete list of foods and beverages to avoid. Contact your dietitian for more information.  This information is not intended to replace advice given to you by your health care provider. Make sure you discuss any questions you have with your health care provider.  Document Released: 06/18/2013 Document Revised: 08/24/2017 Document Reviewed: 03/19/2015  [employer] Interactive Patient Education © 2018 [employer]

## 2019-01-29 NOTE — PROGRESS NOTES
Subjective   Karishma Ocasio is a 63 y.o. female.     .Well Adult Physical   Patient here for a comprehensive physical exam.The patient reports problems - ear itching     Do you take any herbs or supplements that were not prescribed by a doctor? no   Are you taking calcium supplements? yes   Are you taking aspirin daily? no     History:  LMP: No LMP recorded. Patient is postmenopausal.  Menopause at 52 years  Last pap date: 2017  Abnormal pap? no  : 2  Para: 2    She works full time in nursing. She had been walking more until the cold. She is checking her blood pressure once weekly and readings less than 140/90. She is up to date with dental exam. She is due for eye exam.,          The following portions of the patient's history were reviewed and updated as appropriate: allergies, current medications, past family history, past medical history, past social history, past surgical history and problem list.    Review of Systems   Constitutional: Negative for appetite change, chills, fatigue and fever.        Overall doing well    HENT: Negative for congestion, ear pain, hearing loss, mouth sores, nosebleeds, postnasal drip, rhinorrhea, sinus pressure, sneezing, sore throat, tinnitus, trouble swallowing and voice change.         Bilateral ear itchings    Eyes: Negative for visual disturbance.   Respiratory: Negative for cough, chest tightness, shortness of breath and wheezing.    Cardiovascular: Negative for chest pain, palpitations and leg swelling.   Gastrointestinal: Negative for abdominal pain, anal bleeding, blood in stool, constipation, diarrhea, nausea and vomiting.   Endocrine: Negative for cold intolerance, heat intolerance, polydipsia, polyphagia and polyuria.   Genitourinary: Negative for dysuria, frequency, hematuria and urgency.   Musculoskeletal: Negative for arthralgias (typically for aging ), back pain, gait problem, joint swelling, myalgias, neck pain and neck stiffness.   Skin: Negative for  color change and rash.        NEGATIVE BREAST MASS, BREAST PAIN, NIPPLE DISCHARGE, SKIN CHANGES, OR LUMP IN ARMPIT   Allergic/Immunologic: Positive for environmental allergies.   Neurological: Negative for dizziness, tremors, seizures, syncope, speech difficulty, weakness, numbness and headaches.   Hematological: Negative for adenopathy. Does not bruise/bleed easily.   Psychiatric/Behavioral: Negative for behavioral problems, confusion, decreased concentration (stable lexapro ), sleep disturbance and suicidal ideas. The patient is not nervous/anxious (stable with lexapro ).        Objective   Physical Exam   Constitutional: She appears well-developed and well-nourished.   HENT:   Right Ear: Hearing and external ear normal. There is swelling and tenderness.   Left Ear: Hearing and external ear normal. There is swelling and tenderness.   Nose: Nose normal. Right sinus exhibits no maxillary sinus tenderness and no frontal sinus tenderness. Left sinus exhibits no maxillary sinus tenderness and no frontal sinus tenderness.   Mouth/Throat: No posterior oropharyngeal erythema (mild drainage ). No tonsillar exudate.   Bilateral external ears with scaly dry skin    Eyes: Conjunctivae, EOM and lids are normal. Pupils are equal, round, and reactive to light.   Neck: Trachea normal. Neck supple. No JVD present. Carotid bruit is not present. No tracheal deviation present. No thyroid mass and no thyromegaly present.   Cardiovascular: Normal rate, regular rhythm, S1 normal and S2 normal. Exam reveals no gallop and no friction rub.   No murmur heard.  Pulses:       Carotid pulses are 2+ on the right side, and 2+ on the left side.       Radial pulses are 2+ on the right side, and 2+ on the left side.        Dorsalis pedis pulses are 2+ on the right side, and 2+ on the left side.        Posterior tibial pulses are 2+ on the right side, and 2+ on the left side.   No pedal edema    Pulmonary/Chest: Effort normal and breath sounds  normal. Chest wall is not dull to percussion. Right breast exhibits no inverted nipple, no mass, no nipple discharge, no skin change and no tenderness. Left breast exhibits no inverted nipple, no mass, no nipple discharge, no skin change and no tenderness.   Abdominal: Soft. Normal aorta and bowel sounds are normal. She exhibits no abdominal bruit. There is no hepatosplenomegaly. There is no tenderness. There is no rebound and no guarding. No hernia.   Genitourinary:   Genitourinary Comments: Not performed    Musculoskeletal: She exhibits no edema.        Lumbar back: She exhibits decreased range of motion.   (-) SLR    Lymphadenopathy:        Head (right side): No submental, no submandibular, no tonsillar, no preauricular, no posterior auricular and no occipital adenopathy present.        Head (left side): No submental, no submandibular, no tonsillar, no preauricular, no posterior auricular and no occipital adenopathy present.     She has no cervical adenopathy.     She has no axillary adenopathy.        Right: No supraclavicular adenopathy present.        Left: No supraclavicular adenopathy present.   Neurological: She is alert. She has normal strength. No cranial nerve deficit or sensory deficit. She displays a negative Romberg sign.   Reflex Scores:       Brachioradialis reflexes are 2+ on the right side and 2+ on the left side.       Patellar reflexes are 2+ on the right side and 2+ on the left side.  Skin: Skin is warm and dry. No rash noted.   Psychiatric: She has a normal mood and affect. Her speech is normal and behavior is normal. Judgment and thought content normal. Cognition and memory are normal.   Nursing note and vitals reviewed.      Assessment/Plan   Karishma was seen today for annual exam.    Diagnoses and all orders for this visit:    Annual physical exam    Essential hypertension  -     ECG 12 Lead    Acute otitis externa of both ears, unspecified type  -     neomycin-polymyxin-hydrocortisone  (CORTISPORIN) 3.5-65113-4 otic solution; Administer 3 drops into both ears 4 (Four) Times a Day for 7 days.    I reviewed most recent lab work. We discussed low fat/cholesterol diet, exercise and weight loss. She declines statin therapy.   She was advised to shingrix vaccination  She declined TDAP today.   I reviewed results of mammogram and bone density performed today.     ECG 12 Lead  Date/Time: 1/29/2019 12:02 PM  Performed by: Radha Cervantes APRN  Authorized by: Radha Cervantes APRN   Comparison: compared with previous ECG from 1/13/2017  Rhythm: sinus rhythm  Rate: normal  Conduction: 1st degree  ST Segments: ST segments normal  T Waves: T waves normal  QRS axis: normal  Clinical impression: abnormal ECG

## 2019-03-27 DIAGNOSIS — I10 ESSENTIAL HYPERTENSION: ICD-10-CM

## 2019-03-27 RX ORDER — TRIAMTERENE AND HYDROCHLOROTHIAZIDE 37.5; 25 MG/1; MG/1
1 CAPSULE ORAL DAILY
Qty: 90 CAPSULE | Refills: 3 | Status: SHIPPED | OUTPATIENT
Start: 2019-03-27 | End: 2020-03-16

## 2019-04-08 DIAGNOSIS — I10 ESSENTIAL HYPERTENSION: ICD-10-CM

## 2019-04-08 RX ORDER — ESCITALOPRAM OXALATE 10 MG/1
10 TABLET ORAL DAILY
Qty: 90 TABLET | Refills: 1 | Status: SHIPPED | OUTPATIENT
Start: 2019-04-08 | End: 2019-10-01 | Stop reason: SDUPTHER

## 2019-04-29 ENCOUNTER — OFFICE VISIT (OUTPATIENT)
Dept: INTERNAL MEDICINE | Facility: CLINIC | Age: 64
End: 2019-04-29

## 2019-04-29 VITALS
WEIGHT: 220 LBS | SYSTOLIC BLOOD PRESSURE: 136 MMHG | OXYGEN SATURATION: 98 % | DIASTOLIC BLOOD PRESSURE: 86 MMHG | HEART RATE: 74 BPM | BODY MASS INDEX: 33.34 KG/M2 | HEIGHT: 68 IN

## 2019-04-29 DIAGNOSIS — E78.2 MIXED HYPERLIPIDEMIA: ICD-10-CM

## 2019-04-29 DIAGNOSIS — E03.9 HYPOTHYROIDISM, UNSPECIFIED TYPE: ICD-10-CM

## 2019-04-29 DIAGNOSIS — R73.01 ELEVATED FASTING GLUCOSE: ICD-10-CM

## 2019-04-29 DIAGNOSIS — F39 MOOD DISORDER (HCC): ICD-10-CM

## 2019-04-29 DIAGNOSIS — I10 ESSENTIAL HYPERTENSION: Primary | ICD-10-CM

## 2019-04-29 LAB
ALBUMIN SERPL-MCNC: 4.4 G/DL (ref 3.5–5.2)
ALBUMIN/GLOB SERPL: 1.2 G/DL
ALP SERPL-CCNC: 72 U/L (ref 39–117)
ALT SERPL W P-5'-P-CCNC: 18 U/L (ref 1–33)
ANION GAP SERPL CALCULATED.3IONS-SCNC: 13 MMOL/L
AST SERPL-CCNC: 16 U/L (ref 1–32)
BILIRUB SERPL-MCNC: 0.6 MG/DL (ref 0.2–1.2)
BUN BLD-MCNC: 15 MG/DL (ref 8–23)
BUN/CREAT SERPL: 19.2 (ref 7–25)
CALCIUM SPEC-SCNC: 10.2 MG/DL (ref 8.6–10.5)
CHLORIDE SERPL-SCNC: 101 MMOL/L (ref 98–107)
CHOLEST SERPL-MCNC: 207 MG/DL (ref 0–200)
CO2 SERPL-SCNC: 27 MMOL/L (ref 22–29)
CREAT BLD-MCNC: 0.78 MG/DL (ref 0.57–1)
DEPRECATED RDW RBC AUTO: 42.6 FL (ref 37–54)
ERYTHROCYTE [DISTWIDTH] IN BLOOD BY AUTOMATED COUNT: 13.3 % (ref 12.3–15.4)
GFR SERPL CREATININE-BSD FRML MDRD: 75 ML/MIN/1.73
GLOBULIN UR ELPH-MCNC: 3.8 GM/DL
GLUCOSE BLD-MCNC: 117 MG/DL (ref 65–99)
HBA1C MFR BLD: 6.47 % (ref 4.8–5.6)
HCT VFR BLD AUTO: 41.7 % (ref 34–46.6)
HDLC SERPL-MCNC: 61 MG/DL (ref 40–60)
HGB BLD-MCNC: 13.5 G/DL (ref 12–15.9)
LDLC SERPL CALC-MCNC: 113 MG/DL (ref 0–100)
LDLC/HDLC SERPL: 1.85 {RATIO}
MCH RBC QN AUTO: 28.8 PG (ref 26.6–33)
MCHC RBC AUTO-ENTMCNC: 32.4 G/DL (ref 31.5–35.7)
MCV RBC AUTO: 89.1 FL (ref 79–97)
PLATELET # BLD AUTO: 305 10*3/MM3 (ref 140–450)
PMV BLD AUTO: 10.5 FL (ref 6–12)
POTASSIUM BLD-SCNC: 5.4 MMOL/L (ref 3.5–5.2)
PROT SERPL-MCNC: 8.2 G/DL (ref 6–8.5)
RBC # BLD AUTO: 4.68 10*6/MM3 (ref 3.77–5.28)
SODIUM BLD-SCNC: 141 MMOL/L (ref 136–145)
TRIGL SERPL-MCNC: 166 MG/DL (ref 0–150)
VLDLC SERPL-MCNC: 33.2 MG/DL (ref 5–40)
WBC NRBC COR # BLD: 7.9 10*3/MM3 (ref 3.4–10.8)

## 2019-04-29 PROCEDURE — 36415 COLL VENOUS BLD VENIPUNCTURE: CPT | Performed by: NURSE PRACTITIONER

## 2019-04-29 PROCEDURE — 99214 OFFICE O/P EST MOD 30 MIN: CPT | Performed by: NURSE PRACTITIONER

## 2019-04-29 PROCEDURE — 80061 LIPID PANEL: CPT | Performed by: NURSE PRACTITIONER

## 2019-04-29 PROCEDURE — 85027 COMPLETE CBC AUTOMATED: CPT | Performed by: NURSE PRACTITIONER

## 2019-04-29 PROCEDURE — 83036 HEMOGLOBIN GLYCOSYLATED A1C: CPT | Performed by: NURSE PRACTITIONER

## 2019-04-29 PROCEDURE — 80053 COMPREHEN METABOLIC PANEL: CPT | Performed by: NURSE PRACTITIONER

## 2019-04-29 NOTE — PROGRESS NOTES
Subjective   Karishma Ocasio is a 63 y.o. female.     She is checking her blood pressure periodically and readings less than 130/80. She has increased her walking. She had given up cheese and chocolate for lent.       Hypertension   This is a chronic problem. The current episode started more than 1 year ago. The problem is unchanged. Pertinent negatives include no anxiety, blurred vision, chest pain, headaches, orthopnea, palpitations, peripheral edema, PND or shortness of breath. Current antihypertension treatment includes beta blockers and diuretics. The current treatment provides significant improvement.   Hyperlipidemia   This is a new problem. The current episode started more than 1 month ago. The problem is uncontrolled. Recent lipid tests were reviewed and are variable. Pertinent negatives include no chest pain or shortness of breath. She is currently on no antihyperlipidemic treatment.        The following portions of the patient's history were reviewed and updated as appropriate: allergies, current medications, past family history, past medical history, past social history, past surgical history and problem list.    Review of Systems   Constitutional: Negative for activity change, appetite change, fatigue and fever.        Overall doing well    Eyes: Negative for blurred vision and visual disturbance.   Respiratory: Negative for cough, shortness of breath and wheezing.    Cardiovascular: Negative for chest pain, palpitations, orthopnea, leg swelling and PND.   Allergic/Immunologic: Positive for environmental allergies.   Neurological: Negative for dizziness, light-headedness and headaches.   Psychiatric/Behavioral: Negative for dysphoric mood, sleep disturbance and suicidal ideas. The patient is not nervous/anxious.        Objective   Physical Exam   Constitutional: She is oriented to person, place, and time. She appears well-developed and well-nourished.   HENT:   Head: Normocephalic.   Nose: Nose normal.    Neck: Carotid bruit is not present. No thyroid mass and no thyromegaly present.   Cardiovascular: Regular rhythm and normal heart sounds. Exam reveals no S3 and no S4.   No murmur heard.  Repeat bp left arm 118/72  No pedal edema    Pulmonary/Chest: Effort normal and breath sounds normal. She has no decreased breath sounds. She has no wheezes. She has no rhonchi. She has no rales.   Musculoskeletal: She exhibits no edema.   Neurological: She is alert and oriented to person, place, and time. Gait normal.   Skin: Skin is warm and dry.   Psychiatric: She has a normal mood and affect.       Assessment/Plan   Karishma was seen today for hypertension.    Diagnoses and all orders for this visit:    Essential hypertension  -     Comprehensive Metabolic Panel; Future  -     CBC (No Diff); Future  -     Comprehensive Metabolic Panel  -     CBC (No Diff)    Hypothyroidism, unspecified type  Comments:  stable     Mood disorder (CMS/HCC)  Comments:  doing well with lexapro     Mixed hyperlipidemia  Comments:  low fat/cholesterol/   Orders:  -     Lipid Panel; Future  -     Lipid Panel    Elevated fasting glucose  Comments:  needs low carb/sugar diet   Orders:  -     Hemoglobin A1c; Future  -     Hemoglobin A1c      She declines shingrix and TDAP today  Reviewed previous lab work.   Needs routine eye exam.

## 2019-04-30 ENCOUNTER — TELEPHONE (OUTPATIENT)
Dept: INTERNAL MEDICINE | Facility: CLINIC | Age: 64
End: 2019-04-30

## 2019-04-30 DIAGNOSIS — E78.2 MIXED HYPERLIPIDEMIA: Primary | ICD-10-CM

## 2019-04-30 RX ORDER — ATORVASTATIN CALCIUM 10 MG/1
10 TABLET, FILM COATED ORAL NIGHTLY
Qty: 30 TABLET | Refills: 3 | Status: SHIPPED | OUTPATIENT
Start: 2019-04-30 | End: 2019-08-19 | Stop reason: SDUPTHER

## 2019-04-30 NOTE — TELEPHONE ENCOUNTER
----- Message from Eliana Oconnor sent at 4/30/2019  1:23 PM EDT -----  Contact: pt - Radha's pt - RE: new Rx  Pt calling and would like a return call regarding message thru my chat. She informs that pt is agreeing to start cholesterol medication.    Please advise.   Thanks    [facility] [address] [place] [address] [facility] AT [facility] [address]([facility] [phone] [facility] [phone]     Pt # [phone]

## 2019-05-07 DIAGNOSIS — I10 ESSENTIAL HYPERTENSION: ICD-10-CM

## 2019-05-07 RX ORDER — ATENOLOL 25 MG/1
25 TABLET ORAL DAILY
Qty: 30 TABLET | Refills: 5 | Status: SHIPPED | OUTPATIENT
Start: 2019-05-07 | End: 2019-11-03 | Stop reason: SDUPTHER

## 2019-07-01 ENCOUNTER — TELEPHONE (OUTPATIENT)
Dept: INTERNAL MEDICINE | Facility: CLINIC | Age: 64
End: 2019-07-01

## 2019-07-01 NOTE — TELEPHONE ENCOUNTER
----- Message from Nubia Carbajal sent at 7/1/2019  8:40 AM EDT -----  Contact: Pt   Pt thinks she has swimmers ear, left ear had water from a shower.  States it is now so painful to lay on it or to use stethoscope.    She is working and wanted to know if she could have something called in.  Pt# [phone]- work.  Would like this to the [facility] retails.    [facility] [phone] (Phone)  [phone] (Fax)

## 2019-07-30 ENCOUNTER — OFFICE VISIT (OUTPATIENT)
Dept: INTERNAL MEDICINE | Facility: CLINIC | Age: 64
End: 2019-07-30

## 2019-07-30 VITALS
HEART RATE: 83 BPM | HEIGHT: 68 IN | SYSTOLIC BLOOD PRESSURE: 134 MMHG | OXYGEN SATURATION: 98 % | WEIGHT: 222.8 LBS | DIASTOLIC BLOOD PRESSURE: 76 MMHG | BODY MASS INDEX: 33.77 KG/M2

## 2019-07-30 DIAGNOSIS — E78.2 MIXED HYPERLIPIDEMIA: ICD-10-CM

## 2019-07-30 DIAGNOSIS — R73.01 ELEVATED FASTING GLUCOSE: ICD-10-CM

## 2019-07-30 DIAGNOSIS — I10 ESSENTIAL HYPERTENSION: Primary | ICD-10-CM

## 2019-07-30 DIAGNOSIS — Z23 NEED FOR SHINGLES VACCINE: ICD-10-CM

## 2019-07-30 DIAGNOSIS — H60.543 DERMATITIS OF EAR CANAL, BILATERAL: ICD-10-CM

## 2019-07-30 DIAGNOSIS — F39 MOOD DISORDER (HCC): ICD-10-CM

## 2019-07-30 DIAGNOSIS — E03.9 HYPOTHYROIDISM, UNSPECIFIED TYPE: ICD-10-CM

## 2019-07-30 LAB
ALBUMIN SERPL-MCNC: 4.4 G/DL (ref 3.5–5.2)
ALBUMIN/GLOB SERPL: 1.3 G/DL
ALP SERPL-CCNC: 74 U/L (ref 39–117)
ALT SERPL W P-5'-P-CCNC: 18 U/L (ref 1–33)
ANION GAP SERPL CALCULATED.3IONS-SCNC: 12 MMOL/L (ref 5–15)
AST SERPL-CCNC: 16 U/L (ref 1–32)
BILIRUB SERPL-MCNC: 0.7 MG/DL (ref 0.2–1.2)
BUN BLD-MCNC: 14 MG/DL (ref 8–23)
BUN/CREAT SERPL: 18.4 (ref 7–25)
CALCIUM SPEC-SCNC: 9.7 MG/DL (ref 8.6–10.5)
CHLORIDE SERPL-SCNC: 101 MMOL/L (ref 98–107)
CHOLEST SERPL-MCNC: 149 MG/DL (ref 0–200)
CO2 SERPL-SCNC: 26 MMOL/L (ref 22–29)
CREAT BLD-MCNC: 0.76 MG/DL (ref 0.57–1)
DEPRECATED RDW RBC AUTO: 39.4 FL (ref 37–54)
ERYTHROCYTE [DISTWIDTH] IN BLOOD BY AUTOMATED COUNT: 12.6 % (ref 12.3–15.4)
GFR SERPL CREATININE-BSD FRML MDRD: 77 ML/MIN/1.73
GLOBULIN UR ELPH-MCNC: 3.4 GM/DL
GLUCOSE BLD-MCNC: 117 MG/DL (ref 65–99)
HBA1C MFR BLD: 6.49 % (ref 4.8–5.6)
HCT VFR BLD AUTO: 39.2 % (ref 34–46.6)
HDLC SERPL-MCNC: 57 MG/DL (ref 40–60)
HGB BLD-MCNC: 13 G/DL (ref 12–15.9)
LDLC SERPL CALC-MCNC: 64 MG/DL (ref 0–100)
LDLC/HDLC SERPL: 1.12 {RATIO}
MCH RBC QN AUTO: 28.8 PG (ref 26.6–33)
MCHC RBC AUTO-ENTMCNC: 33.2 G/DL (ref 31.5–35.7)
MCV RBC AUTO: 86.9 FL (ref 79–97)
PLATELET # BLD AUTO: 266 10*3/MM3 (ref 140–450)
PMV BLD AUTO: 10.3 FL (ref 6–12)
POTASSIUM BLD-SCNC: 4.3 MMOL/L (ref 3.5–5.2)
PROT SERPL-MCNC: 7.8 G/DL (ref 6–8.5)
RBC # BLD AUTO: 4.51 10*6/MM3 (ref 3.77–5.28)
SODIUM BLD-SCNC: 139 MMOL/L (ref 136–145)
TRIGL SERPL-MCNC: 142 MG/DL (ref 0–150)
TSH SERPL-ACNC: 1.24 MIU/ML (ref 0.27–4.2)
VLDLC SERPL-MCNC: 28.4 MG/DL (ref 5–40)
WBC NRBC COR # BLD: 7.68 10*3/MM3 (ref 3.4–10.8)

## 2019-07-30 PROCEDURE — 83036 HEMOGLOBIN GLYCOSYLATED A1C: CPT | Performed by: NURSE PRACTITIONER

## 2019-07-30 PROCEDURE — 85027 COMPLETE CBC AUTOMATED: CPT | Performed by: NURSE PRACTITIONER

## 2019-07-30 PROCEDURE — 80061 LIPID PANEL: CPT | Performed by: NURSE PRACTITIONER

## 2019-07-30 PROCEDURE — 90750 HZV VACC RECOMBINANT IM: CPT | Performed by: NURSE PRACTITIONER

## 2019-07-30 PROCEDURE — 80053 COMPREHEN METABOLIC PANEL: CPT | Performed by: NURSE PRACTITIONER

## 2019-07-30 PROCEDURE — 90471 IMMUNIZATION ADMIN: CPT | Performed by: NURSE PRACTITIONER

## 2019-07-30 PROCEDURE — 99214 OFFICE O/P EST MOD 30 MIN: CPT | Performed by: NURSE PRACTITIONER

## 2019-07-30 RX ORDER — BETAMETHASONE DIPROPIONATE 0.5 MG/G
CREAM TOPICAL DAILY PRN
Qty: 15 G | Refills: 1 | Status: SHIPPED | OUTPATIENT
Start: 2019-07-30 | End: 2021-10-26 | Stop reason: SDUPTHER

## 2019-07-30 NOTE — PROGRESS NOTES
Subjective   Karishma Ocasio is a 64 y.o. female.     She is checking her blood pressure weekly and readings less than 130/80. She is not exercising. She has been very busy at work.       Hypertension   This is a chronic problem. The current episode started more than 1 year ago. The problem is controlled. Pertinent negatives include no anxiety, blurred vision, chest pain, headaches, orthopnea, palpitations, peripheral edema, PND or shortness of breath. Current antihypertension treatment includes beta blockers and diuretics. The current treatment provides significant improvement.   Hyperlipidemia   This is a chronic problem. The current episode started more than 1 year ago. The problem is controlled. Recent lipid tests were reviewed and are normal. Pertinent negatives include no chest pain, myalgias or shortness of breath. Current antihyperlipidemic treatment includes statins. The current treatment provides significant improvement of lipids.   Depression   Visit Type: follow-up  Patient presents with the following symptoms: depressed mood.  Patient is not experiencing: decreased concentration, feelings of worthlessness, insomnia, irritability, palpitations, restlessness, shortness of breath, thoughts of death and weight gain.  Frequency of symptoms: constantly   Severity: mild   Sleep quality: good  Nighttime awakenings: one to two  Compliance with medications:  %             The following portions of the patient's history were reviewed and updated as appropriate: allergies, current medications, past family history, past medical history, past social history, past surgical history and problem list.    Review of Systems   Constitutional: Negative for activity change, appetite change, fatigue, fever, irritability and weight gain.   HENT:        Bilateral ears itching    Eyes: Negative for blurred vision and visual disturbance (she went to eye doctor 6/2019).   Respiratory: Negative for cough, shortness of breath  and wheezing.    Cardiovascular: Negative for chest pain, palpitations, orthopnea, leg swelling and PND.   Musculoskeletal: Negative for myalgias.   Neurological: Negative for dizziness and headaches.   Psychiatric/Behavioral: Negative for decreased concentration. The patient does not have insomnia.        Objective   Physical Exam   Constitutional: She is oriented to person, place, and time. She appears well-developed and well-nourished.   HENT:   Head: Normocephalic.   Nose: Nose normal.   Dry scaly skin to bilateral ears    Neck: Carotid bruit is not present. No thyroid mass and no thyromegaly present.   Cardiovascular: Regular rhythm and normal heart sounds. Exam reveals no S3 and no S4.   No murmur heard.  Repeat bp left arm 122/78  No pedal edema    Pulmonary/Chest: Effort normal and breath sounds normal. She has no decreased breath sounds. She has no wheezes. She has no rhonchi. She has no rales.   Musculoskeletal: She exhibits no edema.   Neurological: She is alert and oriented to person, place, and time. Gait normal.   Skin: Skin is warm and dry.   Psychiatric: She has a normal mood and affect. Her speech is normal and behavior is normal. Judgment and thought content normal. Cognition and memory are normal.       Assessment/Plan   Karishma was seen today for hypertension.    Diagnoses and all orders for this visit:    Essential hypertension  -     Comprehensive Metabolic Panel; Future  -     CBC No Differential; Future  -     TSH Rfx On Abnormal To Free T4; Future  -     Comprehensive Metabolic Panel  -     CBC No Differential  -     TSH Rfx On Abnormal To Free T4    Mixed hyperlipidemia  Comments:  tolerating statin therapy   Orders:  -     Lipid Panel; Future  -     Lipid Panel    Hypothyroidism, unspecified type    Mood disorder (CMS/HCC)  Comments:  stable wtih lexapro     Need for shingles vaccine  -     Shingrix Vaccine    Dermatitis of ear canal, bilateral  -     betamethasone dipropionate (DIPROLENE)  0.05 % cream; Apply  topically to the appropriate area as directed Daily As Needed for Irritation or Rash.    She was advised to TDAP. Needs diet, exercise and weight loss. Discussed need low carb/sugar diet.

## 2019-08-19 DIAGNOSIS — E78.2 MIXED HYPERLIPIDEMIA: ICD-10-CM

## 2019-08-19 RX ORDER — ATORVASTATIN CALCIUM 10 MG/1
10 TABLET, FILM COATED ORAL NIGHTLY
Qty: 30 TABLET | Refills: 3 | Status: SHIPPED | OUTPATIENT
Start: 2019-08-19 | End: 2019-12-23

## 2019-10-01 DIAGNOSIS — I10 ESSENTIAL HYPERTENSION: ICD-10-CM

## 2019-10-02 RX ORDER — ESCITALOPRAM OXALATE 10 MG/1
TABLET ORAL
Qty: 90 TABLET | Refills: 1 | Status: SHIPPED | OUTPATIENT
Start: 2019-10-02 | End: 2020-04-01

## 2019-11-03 DIAGNOSIS — I10 ESSENTIAL HYPERTENSION: ICD-10-CM

## 2019-11-04 RX ORDER — ATENOLOL 25 MG/1
TABLET ORAL
Qty: 30 TABLET | Refills: 5 | Status: SHIPPED | OUTPATIENT
Start: 2019-11-04 | End: 2020-04-28

## 2019-11-10 ENCOUNTER — PATIENT MESSAGE (OUTPATIENT)
Dept: INTERNAL MEDICINE | Facility: CLINIC | Age: 64
End: 2019-11-10

## 2019-11-11 NOTE — TELEPHONE ENCOUNTER
From: Karishma Ocasio  To: Radha Cervantes APRN  Sent: 11/10/2019 9:54 PM EST  Subject: Non-Urgent Medical Question    Radha,   I see you next on 12/4. Is it okay to wait until then to get my second shingles shot or do I need it before then? Also my mother Maggy Mar 12- is asking if she needs another pneumonia shot and if so when should it be?    Thanks, Veronica

## 2019-12-04 ENCOUNTER — OFFICE VISIT (OUTPATIENT)
Dept: INTERNAL MEDICINE | Facility: CLINIC | Age: 64
End: 2019-12-04

## 2019-12-04 ENCOUNTER — TRANSCRIBE ORDERS (OUTPATIENT)
Dept: ADMINISTRATIVE | Facility: HOSPITAL | Age: 64
End: 2019-12-04

## 2019-12-04 VITALS
BODY MASS INDEX: 33.12 KG/M2 | SYSTOLIC BLOOD PRESSURE: 120 MMHG | HEIGHT: 67 IN | DIASTOLIC BLOOD PRESSURE: 76 MMHG | WEIGHT: 211 LBS

## 2019-12-04 DIAGNOSIS — E78.2 MIXED HYPERLIPIDEMIA: Primary | ICD-10-CM

## 2019-12-04 DIAGNOSIS — Z23 NEED FOR VACCINATION: ICD-10-CM

## 2019-12-04 DIAGNOSIS — R73.01 ELEVATED FASTING GLUCOSE: ICD-10-CM

## 2019-12-04 DIAGNOSIS — Z12.31 BREAST CANCER SCREENING BY MAMMOGRAM: Primary | ICD-10-CM

## 2019-12-04 DIAGNOSIS — F39 MOOD DISORDER (HCC): ICD-10-CM

## 2019-12-04 DIAGNOSIS — I10 ESSENTIAL HYPERTENSION: ICD-10-CM

## 2019-12-04 LAB
ALBUMIN SERPL-MCNC: 4.5 G/DL (ref 3.5–5.2)
ALBUMIN/GLOB SERPL: 1.4 G/DL
ALP SERPL-CCNC: 76 U/L (ref 39–117)
ALT SERPL W P-5'-P-CCNC: 17 U/L (ref 1–33)
ANION GAP SERPL CALCULATED.3IONS-SCNC: 13.2 MMOL/L (ref 5–15)
AST SERPL-CCNC: 13 U/L (ref 1–32)
BILIRUB SERPL-MCNC: 0.7 MG/DL (ref 0.2–1.2)
BUN BLD-MCNC: 13 MG/DL (ref 8–23)
BUN/CREAT SERPL: 18.3 (ref 7–25)
CALCIUM SPEC-SCNC: 9.5 MG/DL (ref 8.6–10.5)
CHLORIDE SERPL-SCNC: 101 MMOL/L (ref 98–107)
CHOLEST SERPL-MCNC: 150 MG/DL (ref 0–200)
CO2 SERPL-SCNC: 25.8 MMOL/L (ref 22–29)
CREAT BLD-MCNC: 0.71 MG/DL (ref 0.57–1)
DEPRECATED RDW RBC AUTO: 40.3 FL (ref 37–54)
ERYTHROCYTE [DISTWIDTH] IN BLOOD BY AUTOMATED COUNT: 13.1 % (ref 12.3–15.4)
GFR SERPL CREATININE-BSD FRML MDRD: 83 ML/MIN/1.73
GLOBULIN UR ELPH-MCNC: 3.3 GM/DL
GLUCOSE BLD-MCNC: 115 MG/DL (ref 65–99)
HBA1C MFR BLD: 6.5 % (ref 4.8–5.6)
HCT VFR BLD AUTO: 39.2 % (ref 34–46.6)
HDLC SERPL-MCNC: 57 MG/DL (ref 40–60)
HGB BLD-MCNC: 12.8 G/DL (ref 12–15.9)
LDLC SERPL CALC-MCNC: 62 MG/DL (ref 0–100)
LDLC/HDLC SERPL: 1.09 {RATIO}
MCH RBC QN AUTO: 27.5 PG (ref 26.6–33)
MCHC RBC AUTO-ENTMCNC: 32.7 G/DL (ref 31.5–35.7)
MCV RBC AUTO: 84.3 FL (ref 79–97)
PLATELET # BLD AUTO: 302 10*3/MM3 (ref 140–450)
PMV BLD AUTO: 10.2 FL (ref 6–12)
POTASSIUM BLD-SCNC: 4.2 MMOL/L (ref 3.5–5.2)
PROT SERPL-MCNC: 7.8 G/DL (ref 6–8.5)
RBC # BLD AUTO: 4.65 10*6/MM3 (ref 3.77–5.28)
SODIUM BLD-SCNC: 140 MMOL/L (ref 136–145)
TRIGL SERPL-MCNC: 154 MG/DL (ref 0–150)
VLDLC SERPL-MCNC: 30.8 MG/DL
WBC NRBC COR # BLD: 6.86 10*3/MM3 (ref 3.4–10.8)

## 2019-12-04 PROCEDURE — 99214 OFFICE O/P EST MOD 30 MIN: CPT | Performed by: NURSE PRACTITIONER

## 2019-12-04 PROCEDURE — 83036 HEMOGLOBIN GLYCOSYLATED A1C: CPT | Performed by: NURSE PRACTITIONER

## 2019-12-04 PROCEDURE — 85027 COMPLETE CBC AUTOMATED: CPT | Performed by: NURSE PRACTITIONER

## 2019-12-04 PROCEDURE — 90750 HZV VACC RECOMBINANT IM: CPT | Performed by: NURSE PRACTITIONER

## 2019-12-04 PROCEDURE — 90471 IMMUNIZATION ADMIN: CPT | Performed by: NURSE PRACTITIONER

## 2019-12-04 PROCEDURE — 80053 COMPREHEN METABOLIC PANEL: CPT | Performed by: NURSE PRACTITIONER

## 2019-12-04 PROCEDURE — 80061 LIPID PANEL: CPT | Performed by: NURSE PRACTITIONER

## 2019-12-04 NOTE — PROGRESS NOTES
Answers for HPI/ROS submitted by the patient on 11/27/2019   Hypertension  Chronicity: chronic  Onset: more than 1 year ago  Progression since onset: rapidly improving  Condition status: controlled  anxiety: No  blurred vision: No  chest pain: No  headaches: No  malaise/fatigue: No  neck pain: No  orthopnea: No  palpitations: No  peripheral edema: No  PND: No  shortness of breath: No  sweats: No  Agents associated with hypertension: NSAIDs  CAD risks: obesity  Compliance problems: no compliance problems

## 2019-12-04 NOTE — PROGRESS NOTES
Subjective   Karishma Ocasio is a 64 y.o. female.     Overall doing well. She is checking blood pressure weekly and readings less than 140/90. She is scheduled for mammogram next month. She has been working on weight loss and has lost 12 lbs since last visit. She is not exercising much.       Hypertension   This is a chronic problem. The current episode started more than 1 year ago. The problem has been rapidly improving since onset. The problem is controlled. Pertinent negatives include no anxiety, blurred vision, chest pain, headaches, malaise/fatigue, neck pain, orthopnea, palpitations, peripheral edema, PND, shortness of breath or sweats. Risk factors for coronary artery disease include obesity. Current antihypertension treatment includes diuretics and beta blockers. The current treatment provides significant improvement. There are no compliance problems.    Hyperlipidemia   This is a chronic problem. The current episode started more than 1 year ago. The problem is controlled. Recent lipid tests were reviewed and are normal. Pertinent negatives include no chest pain, myalgias or shortness of breath. Current antihyperlipidemic treatment includes statins. The current treatment provides significant improvement of lipids.        The following portions of the patient's history were reviewed and updated as appropriate: allergies, current medications, past family history, past medical history, past social history, past surgical history and problem list.    Review of Systems   Constitutional: Negative for activity change, appetite change, fatigue, fever and malaise/fatigue.        Doing well  Intentional weight loss    Eyes: Negative for blurred vision and visual disturbance.   Respiratory: Negative for cough, shortness of breath and wheezing.    Cardiovascular: Negative for chest pain, palpitations, orthopnea, leg swelling and PND.   Musculoskeletal: Negative for myalgias and neck pain.   Neurological: Negative for  dizziness and headaches.       Objective   Physical Exam   Constitutional: She is oriented to person, place, and time. She appears well-developed and well-nourished.   HENT:   Head: Normocephalic.   Nose: Nose normal.   Cardiovascular: Regular rhythm and normal heart sounds. Exam reveals no S3 and no S4.   No murmur heard.  Repeat bp left arm 110/72  No pedal edema    Pulmonary/Chest: Effort normal and breath sounds normal. She has no decreased breath sounds. She has no wheezes. She has no rhonchi. She has no rales.   Musculoskeletal: She exhibits no edema.   Neurological: She is alert and oriented to person, place, and time. Gait normal.   Skin: Skin is warm and dry.   Psychiatric: She has a normal mood and affect.       Assessment/Plan   Karishma was seen today for hyperlipidemia, hypertension and mood disorder.    Diagnoses and all orders for this visit:    Mixed hyperlipidemia  Comments:  tolerating statin therapy   Orders:  -     Lipid Panel; Future  -     Lipid Panel    Essential hypertension  Comments:  controlled   Orders:  -     Comprehensive Metabolic Panel; Future  -     CBC No Differential; Future  -     Comprehensive Metabolic Panel  -     CBC No Differential    Mood disorder (CMS/HCC)  Comments:  stable with lexapro     Elevated fasting glucose  Comments:  discussed low carb/sugar diet and exercise   Orders:  -     Hemoglobin A1c; Future  -     Hemoglobin A1c    Need for vaccination  -     Shingrix Vaccine      She was advised to TDAP vaccination

## 2019-12-23 DIAGNOSIS — E78.2 MIXED HYPERLIPIDEMIA: ICD-10-CM

## 2019-12-23 RX ORDER — ATORVASTATIN CALCIUM 10 MG/1
TABLET, FILM COATED ORAL
Qty: 30 TABLET | Refills: 3 | Status: SHIPPED | OUTPATIENT
Start: 2019-12-23 | End: 2020-04-17

## 2020-02-04 ENCOUNTER — HOSPITAL ENCOUNTER (OUTPATIENT)
Dept: MAMMOGRAPHY | Facility: HOSPITAL | Age: 65
Discharge: HOME | End: 2020-02-04
Admitting: NURSE PRACTITIONER

## 2020-02-04 DIAGNOSIS — Z12.31 BREAST CANCER SCREENING BY MAMMOGRAM: ICD-10-CM

## 2020-02-04 PROCEDURE — 77067 SCR MAMMO BI INCL CAD: CPT

## 2020-02-04 PROCEDURE — 77063 BREAST TOMOSYNTHESIS BI: CPT

## 2020-03-14 DIAGNOSIS — I10 ESSENTIAL HYPERTENSION: ICD-10-CM

## 2020-03-16 RX ORDER — TRIAMTERENE AND HYDROCHLOROTHIAZIDE 37.5; 25 MG/1; MG/1
CAPSULE ORAL
Qty: 90 CAPSULE | Refills: 1 | Status: SHIPPED | OUTPATIENT
Start: 2020-03-16 | End: 2020-09-28

## 2020-04-01 DIAGNOSIS — I10 ESSENTIAL HYPERTENSION: ICD-10-CM

## 2020-04-01 RX ORDER — ESCITALOPRAM OXALATE 10 MG/1
TABLET ORAL
Qty: 90 TABLET | Refills: 1 | Status: SHIPPED | OUTPATIENT
Start: 2020-04-01 | End: 2020-09-28

## 2020-04-02 RX ORDER — NEOMYCIN SULFATE, POLYMYXIN B SULFATE, HYDROCORTISONE 3.5; 10000; 1 MG/ML; [USP'U]/ML; MG/ML
3 SOLUTION/ DROPS AURICULAR (OTIC) 4 TIMES DAILY
Qty: 1 BOTTLE | Refills: 1 | Status: SHIPPED | OUTPATIENT
Start: 2020-04-02 | End: 2021-06-11 | Stop reason: SDUPTHER

## 2020-04-06 ENCOUNTER — PATIENT MESSAGE (OUTPATIENT)
Dept: INTERNAL MEDICINE | Facility: CLINIC | Age: 65
End: 2020-04-06

## 2020-04-06 NOTE — TELEPHONE ENCOUNTER
From: Karishma Ocasio  To: Radha Cervantes APRN  Sent: 4/6/2020 12:02 PM EDT  Subject: Non-Urgent Medical Question    Radha, are you still seeing pts? I have a physical scheduled with you Thursday and am wondering if I should reschedule? My mother had an appointment for regular check up with you I think 4/15 and she has not been out of her house in at least 2 weeks so I don’t think she wants to come.     Thanks Veronica

## 2020-04-17 DIAGNOSIS — E78.2 MIXED HYPERLIPIDEMIA: ICD-10-CM

## 2020-04-17 RX ORDER — ATORVASTATIN CALCIUM 10 MG/1
TABLET, FILM COATED ORAL
Qty: 30 TABLET | Refills: 3 | Status: SHIPPED | OUTPATIENT
Start: 2020-04-17 | End: 2020-06-29

## 2020-04-28 DIAGNOSIS — I10 ESSENTIAL HYPERTENSION: ICD-10-CM

## 2020-04-28 RX ORDER — ATENOLOL 25 MG/1
TABLET ORAL
Qty: 30 TABLET | Refills: 5 | Status: SHIPPED | OUTPATIENT
Start: 2020-04-28 | End: 2020-10-26

## 2020-06-04 ENCOUNTER — OFFICE VISIT (OUTPATIENT)
Dept: INTERNAL MEDICINE | Facility: CLINIC | Age: 65
End: 2020-06-04

## 2020-06-04 VITALS
HEIGHT: 67 IN | HEART RATE: 81 BPM | DIASTOLIC BLOOD PRESSURE: 70 MMHG | SYSTOLIC BLOOD PRESSURE: 124 MMHG | BODY MASS INDEX: 32.08 KG/M2 | WEIGHT: 204.4 LBS | OXYGEN SATURATION: 99 %

## 2020-06-04 DIAGNOSIS — R73.01 ELEVATED FASTING GLUCOSE: ICD-10-CM

## 2020-06-04 DIAGNOSIS — Z12.4 CERVICAL CANCER SCREENING: ICD-10-CM

## 2020-06-04 DIAGNOSIS — H60.502 ACUTE OTITIS EXTERNA OF LEFT EAR, UNSPECIFIED TYPE: ICD-10-CM

## 2020-06-04 DIAGNOSIS — Z12.11 COLON CANCER SCREENING: ICD-10-CM

## 2020-06-04 DIAGNOSIS — Z00.00 ANNUAL PHYSICAL EXAM: Primary | ICD-10-CM

## 2020-06-04 LAB
ALBUMIN SERPL-MCNC: 4.4 G/DL (ref 3.5–5.2)
ALBUMIN/GLOB SERPL: 1.5 G/DL
ALP SERPL-CCNC: 74 U/L (ref 39–117)
ALT SERPL-CCNC: 17 U/L (ref 1–33)
AST SERPL-CCNC: 14 U/L (ref 1–32)
BASOPHILS # BLD AUTO: 0.07 10*3/MM3 (ref 0–0.2)
BASOPHILS NFR BLD AUTO: 1 % (ref 0–1.5)
BILIRUB SERPL-MCNC: 0.6 MG/DL (ref 0.2–1.2)
BUN SERPL-MCNC: 15 MG/DL (ref 8–23)
BUN/CREAT SERPL: 20.8 (ref 7–25)
CALCIUM SERPL-MCNC: 9.8 MG/DL (ref 8.6–10.5)
CHLORIDE SERPL-SCNC: 101 MMOL/L (ref 98–107)
CHOLEST SERPL-MCNC: 144 MG/DL (ref 0–200)
CO2 SERPL-SCNC: 26.5 MMOL/L (ref 22–29)
CREAT SERPL-MCNC: 0.72 MG/DL (ref 0.57–1)
EOSINOPHIL # BLD AUTO: 0.3 10*3/MM3 (ref 0–0.4)
EOSINOPHIL NFR BLD AUTO: 4.5 % (ref 0.3–6.2)
ERYTHROCYTE [DISTWIDTH] IN BLOOD BY AUTOMATED COUNT: 13.1 % (ref 12.3–15.4)
GLOBULIN SER CALC-MCNC: 3 GM/DL
GLUCOSE SERPL-MCNC: 107 MG/DL (ref 65–99)
HBA1C MFR BLD: 6.3 % (ref 4.8–5.6)
HCT VFR BLD AUTO: 38.7 % (ref 34–46.6)
HDLC SERPL-MCNC: 57 MG/DL (ref 40–60)
HEMOCCULT STL QL IA: NEGATIVE
HGB BLD-MCNC: 12.7 G/DL (ref 12–15.9)
IMM GRANULOCYTES # BLD AUTO: 0.01 10*3/MM3 (ref 0–0.05)
IMM GRANULOCYTES NFR BLD AUTO: 0.1 % (ref 0–0.5)
LDLC SERPL CALC-MCNC: 56 MG/DL (ref 0–100)
LYMPHOCYTES # BLD AUTO: 1.9 10*3/MM3 (ref 0.7–3.1)
LYMPHOCYTES NFR BLD AUTO: 28.4 % (ref 19.6–45.3)
MCH RBC QN AUTO: 28.8 PG (ref 26.6–33)
MCHC RBC AUTO-ENTMCNC: 32.8 G/DL (ref 31.5–35.7)
MCV RBC AUTO: 87.8 FL (ref 79–97)
MONOCYTES # BLD AUTO: 0.54 10*3/MM3 (ref 0.1–0.9)
MONOCYTES NFR BLD AUTO: 8.1 % (ref 5–12)
NEUTROPHILS # BLD AUTO: 3.86 10*3/MM3 (ref 1.7–7)
NEUTROPHILS NFR BLD AUTO: 57.9 % (ref 42.7–76)
NRBC BLD AUTO-RTO: 0 /100 WBC (ref 0–0.2)
PLATELET # BLD AUTO: 275 10*3/MM3 (ref 140–450)
POTASSIUM SERPL-SCNC: 4.3 MMOL/L (ref 3.5–5.2)
PROT SERPL-MCNC: 7.4 G/DL (ref 6–8.5)
RBC # BLD AUTO: 4.41 10*6/MM3 (ref 3.77–5.28)
SODIUM SERPL-SCNC: 137 MMOL/L (ref 136–145)
TRIGL SERPL-MCNC: 156 MG/DL (ref 0–150)
TSH SERPL DL<=0.005 MIU/L-ACNC: 1.43 UIU/ML (ref 0.27–4.2)
VLDLC SERPL CALC-MCNC: 31.2 MG/DL
WBC # BLD AUTO: 6.68 10*3/MM3 (ref 3.4–10.8)

## 2020-06-04 PROCEDURE — 82274 ASSAY TEST FOR BLOOD FECAL: CPT | Performed by: NURSE PRACTITIONER

## 2020-06-04 PROCEDURE — 99396 PREV VISIT EST AGE 40-64: CPT | Performed by: NURSE PRACTITIONER

## 2020-06-04 NOTE — PROGRESS NOTES
Subjective   Karishma Ocasio is a 64 y.o. female.     Well Adult Physical   Patient here for a comprehensive physical exam.The patient reports no problems    Do you take any herbs or supplements that were not prescribed by a doctor? no   Are you taking calcium supplements? no   Are you taking aspirin daily? no    Overall she is doing well. She is up to date with dental and vision exam. She is continuing with healthy weight loss.        The following portions of the patient's history were reviewed and updated as appropriate: allergies, current medications, past family history, past medical history, past social history, past surgical history and problem list.    Review of Systems   Constitutional: Negative for appetite change, chills, fatigue and fever.        Overall doing well   Intentional weight loss-7lbs since 12/2019   HENT: Negative for congestion, ear pain, hearing loss, mouth sores, nosebleeds, postnasal drip, rhinorrhea, sinus pressure, sneezing, sore throat, tinnitus, trouble swallowing and voice change.    Eyes: Negative for visual disturbance.   Respiratory: Negative for cough, chest tightness, shortness of breath and wheezing.    Cardiovascular: Negative for chest pain, palpitations and leg swelling.   Gastrointestinal: Negative for abdominal pain, anal bleeding, blood in stool, constipation, diarrhea, nausea and vomiting.   Endocrine: Negative for cold intolerance, heat intolerance, polydipsia, polyphagia and polyuria.   Genitourinary: Negative for dysuria, frequency, hematuria and urgency.   Musculoskeletal: Negative for arthralgias, back pain, gait problem, joint swelling, myalgias, neck pain and neck stiffness.   Skin: Negative for color change and rash.        NEGATIVE BREAST MASS, BREAST PAIN, NIPPLE DISCHARGE, SKIN CHANGES, OR LUMP IN ARMPIT   Neurological: Negative for dizziness, tremors, seizures, syncope, speech difficulty, weakness, numbness and headaches.   Hematological: Negative for  adenopathy. Does not bruise/bleed easily.   Psychiatric/Behavioral: Negative for behavioral problems, confusion, decreased concentration, sleep disturbance and suicidal ideas. The patient is not nervous/anxious.        Objective   Physical Exam   Constitutional: She appears well-developed and well-nourished.   HENT:   Right Ear: Hearing, tympanic membrane, external ear and ear canal normal.   Left Ear: Hearing, external ear and ear canal normal. Tympanic membrane is injected. A middle ear effusion is present.   Nose: Nose normal. Right sinus exhibits no maxillary sinus tenderness and no frontal sinus tenderness. Left sinus exhibits no maxillary sinus tenderness and no frontal sinus tenderness.   Mouth/Throat: Uvula is midline, oropharynx is clear and moist and mucous membranes are normal. No tonsillar exudate.   Eyes: Pupils are equal, round, and reactive to light. Conjunctivae, EOM and lids are normal.   Neck: Trachea normal. Neck supple. No JVD present. Carotid bruit is not present. No tracheal deviation present. No thyroid mass and no thyromegaly present.   Cardiovascular: Normal rate, regular rhythm, S1 normal and S2 normal. Exam reveals no gallop and no friction rub.   No murmur heard.  Pulses:       Carotid pulses are 2+ on the right side, and 2+ on the left side.       Radial pulses are 2+ on the right side, and 2+ on the left side.        Dorsalis pedis pulses are 2+ on the right side, and 2+ on the left side.        Posterior tibial pulses are 2+ on the right side, and 2+ on the left side.   No pedal edema    Pulmonary/Chest: Effort normal and breath sounds normal. Chest wall is not dull to percussion. Right breast exhibits no inverted nipple, no mass, no nipple discharge, no skin change and no tenderness. Left breast exhibits no inverted nipple, no mass, no nipple discharge, no skin change and no tenderness.   Abdominal: Soft. Normal aorta and bowel sounds are normal. She exhibits no abdominal bruit. There  is no hepatosplenomegaly. There is no tenderness. There is no rebound and no guarding. No hernia.   Genitourinary: Vagina normal and uterus normal. Rectal exam shows external hemorrhoid. Rectal exam shows guaiac negative stool. Pelvic exam was performed with patient supine. No labial fusion. There is no rash, tenderness, lesion or injury on the right labia. There is no rash, tenderness, lesion or injury on the left labia. Cervix exhibits no discharge. Right adnexum displays no mass, no tenderness and no fullness. Left adnexum displays no mass, no tenderness and no fullness.   Musculoskeletal: Normal range of motion. She exhibits no edema.   (-) SLR    Lymphadenopathy:        Head (right side): No submental, no submandibular, no tonsillar, no preauricular, no posterior auricular and no occipital adenopathy present.        Head (left side): No submental, no submandibular, no tonsillar, no preauricular, no posterior auricular and no occipital adenopathy present.     She has no cervical adenopathy.     She has no axillary adenopathy.        Right: No supraclavicular adenopathy present.        Left: No supraclavicular adenopathy present.   Neurological: She is alert. She has normal strength. No cranial nerve deficit or sensory deficit. She displays a negative Romberg sign.   Reflex Scores:       Patellar reflexes are 2+ on the right side and 2+ on the left side.  Skin: Skin is warm and dry. No rash noted.   Tan skin   Dry feet    Psychiatric: She has a normal mood and affect. Her speech is normal and behavior is normal. Judgment and thought content normal. Cognition and memory are normal.   Nursing note and vitals reviewed.      Assessment/Plan   Karishma was seen today for annual exam.    Diagnoses and all orders for this visit:    Annual physical exam  -     Comprehensive Metabolic Panel  -     Lipid Panel  -     TSH Rfx On Abnormal To Free T4  -     CBC Auto Differential    Cervical cancer screening  -     Pap IG,  HPV-hr    Colon cancer screening  -     POC FECAL OCCULT BLOOD BY IMMUNOASSAY    Elevated fasting glucose  Comments:  will check A1c today, needs low carb/sugar diet   Orders:  -     Hemoglobin A1c    Acute otitis externa of left ear, unspecified type  Comments:  she will restart cortisporin, has prescription at home     She was advised to routine exercise (at least 30-45 minutes 3-4 times week) with healthy diet  Also discussed monthly breast exams  She declines TDAP today.

## 2020-06-08 LAB
CYTOLOGIST CVX/VAG CYTO: NORMAL
CYTOLOGY CVX/VAG DOC CYTO: NORMAL
CYTOLOGY CVX/VAG DOC THIN PREP: NORMAL
DX ICD CODE: NORMAL
HIV 1 & 2 AB SER-IMP: NORMAL
HPV I/H RISK 1 DNA CVX QL PROBE+SIG AMP: NEGATIVE
OTHER STN SPEC: NORMAL
STAT OF ADQ CVX/VAG CYTO-IMP: NORMAL

## 2020-06-16 LAB — REF LAB TEST METHOD: NORMAL

## 2020-06-28 DIAGNOSIS — E78.2 MIXED HYPERLIPIDEMIA: ICD-10-CM

## 2020-06-29 RX ORDER — ATORVASTATIN CALCIUM 10 MG/1
TABLET, FILM COATED ORAL
Qty: 30 TABLET | Refills: 3 | Status: SHIPPED | OUTPATIENT
Start: 2020-06-29 | End: 2020-12-03

## 2020-09-26 DIAGNOSIS — I10 ESSENTIAL HYPERTENSION: ICD-10-CM

## 2020-09-28 RX ORDER — ESCITALOPRAM OXALATE 10 MG/1
10 TABLET ORAL DAILY
Qty: 90 TABLET | Refills: 1 | Status: SHIPPED | OUTPATIENT
Start: 2020-09-28 | End: 2020-12-30

## 2020-09-28 RX ORDER — TRIAMTERENE AND HYDROCHLOROTHIAZIDE 37.5; 25 MG/1; MG/1
1 CAPSULE ORAL DAILY
Qty: 90 CAPSULE | Refills: 1 | Status: SHIPPED | OUTPATIENT
Start: 2020-09-28 | End: 2021-04-28 | Stop reason: SDUPTHER

## 2020-10-08 ENCOUNTER — OFFICE VISIT (OUTPATIENT)
Dept: INTERNAL MEDICINE | Facility: CLINIC | Age: 65
End: 2020-10-08

## 2020-10-08 VITALS
OXYGEN SATURATION: 97 % | DIASTOLIC BLOOD PRESSURE: 64 MMHG | WEIGHT: 196 LBS | SYSTOLIC BLOOD PRESSURE: 100 MMHG | TEMPERATURE: 97.7 F | BODY MASS INDEX: 30.76 KG/M2 | HEART RATE: 67 BPM | HEIGHT: 67 IN

## 2020-10-08 DIAGNOSIS — I10 ESSENTIAL HYPERTENSION: Primary | ICD-10-CM

## 2020-10-08 DIAGNOSIS — E78.2 MIXED HYPERLIPIDEMIA: ICD-10-CM

## 2020-10-08 DIAGNOSIS — R73.01 ELEVATED FASTING GLUCOSE: ICD-10-CM

## 2020-10-08 LAB
ALBUMIN SERPL-MCNC: 4.6 G/DL (ref 3.5–5.2)
ALBUMIN/GLOB SERPL: 1.8 G/DL
ALP SERPL-CCNC: 80 U/L (ref 39–117)
ALT SERPL-CCNC: 19 U/L (ref 1–33)
AST SERPL-CCNC: 19 U/L (ref 1–32)
BASOPHILS # BLD AUTO: 0.07 10*3/MM3 (ref 0–0.2)
BASOPHILS NFR BLD AUTO: 0.9 % (ref 0–1.5)
BILIRUB SERPL-MCNC: 0.7 MG/DL (ref 0–1.2)
BUN SERPL-MCNC: 16 MG/DL (ref 8–23)
BUN/CREAT SERPL: 20.8 (ref 7–25)
CALCIUM SERPL-MCNC: 9.4 MG/DL (ref 8.6–10.5)
CHLORIDE SERPL-SCNC: 104 MMOL/L (ref 98–107)
CHOLEST SERPL-MCNC: 148 MG/DL (ref 0–200)
CO2 SERPL-SCNC: 26 MMOL/L (ref 22–29)
CREAT SERPL-MCNC: 0.77 MG/DL (ref 0.57–1)
EOSINOPHIL # BLD AUTO: 0.31 10*3/MM3 (ref 0–0.4)
EOSINOPHIL NFR BLD AUTO: 4.2 % (ref 0.3–6.2)
ERYTHROCYTE [DISTWIDTH] IN BLOOD BY AUTOMATED COUNT: 12.5 % (ref 12.3–15.4)
GLOBULIN SER CALC-MCNC: 2.5 GM/DL
GLUCOSE SERPL-MCNC: 104 MG/DL (ref 65–99)
HBA1C MFR BLD: 6.3 % (ref 4.8–5.6)
HCT VFR BLD AUTO: 38.1 % (ref 34–46.6)
HDLC SERPL-MCNC: 62 MG/DL (ref 40–60)
HGB BLD-MCNC: 12.7 G/DL (ref 12–15.9)
IMM GRANULOCYTES # BLD AUTO: 0.02 10*3/MM3 (ref 0–0.05)
IMM GRANULOCYTES NFR BLD AUTO: 0.3 % (ref 0–0.5)
LDLC SERPL CALC-MCNC: 66 MG/DL (ref 0–100)
LYMPHOCYTES # BLD AUTO: 1.94 10*3/MM3 (ref 0.7–3.1)
LYMPHOCYTES NFR BLD AUTO: 26.1 % (ref 19.6–45.3)
MCH RBC QN AUTO: 28.5 PG (ref 26.6–33)
MCHC RBC AUTO-ENTMCNC: 33.3 G/DL (ref 31.5–35.7)
MCV RBC AUTO: 85.6 FL (ref 79–97)
MONOCYTES # BLD AUTO: 0.59 10*3/MM3 (ref 0.1–0.9)
MONOCYTES NFR BLD AUTO: 7.9 % (ref 5–12)
NEUTROPHILS # BLD AUTO: 4.5 10*3/MM3 (ref 1.7–7)
NEUTROPHILS NFR BLD AUTO: 60.6 % (ref 42.7–76)
NRBC BLD AUTO-RTO: 0 /100 WBC (ref 0–0.2)
PLATELET # BLD AUTO: 307 10*3/MM3 (ref 140–450)
POTASSIUM SERPL-SCNC: 4.2 MMOL/L (ref 3.5–5.2)
PROT SERPL-MCNC: 7.1 G/DL (ref 6–8.5)
RBC # BLD AUTO: 4.45 10*6/MM3 (ref 3.77–5.28)
SODIUM SERPL-SCNC: 139 MMOL/L (ref 136–145)
TRIGL SERPL-MCNC: 111 MG/DL (ref 0–150)
VLDLC SERPL CALC-MCNC: 20 MG/DL (ref 5–40)
WBC # BLD AUTO: 7.43 10*3/MM3 (ref 3.4–10.8)

## 2020-10-08 PROCEDURE — 99213 OFFICE O/P EST LOW 20 MIN: CPT | Performed by: NURSE PRACTITIONER

## 2020-10-08 NOTE — PROGRESS NOTES
Subjective   Karishma Ocasio is a 65 y.o. female.     She is not monitoring her blood pressure. She is current with intermittent fasting and exercising daily. She is up to date vision exam (yesterday) and dental exam.     Hypertension  This is a chronic problem. The current episode started more than 1 year ago. The problem has been rapidly improving since onset. The problem is controlled. Pertinent negatives include no anxiety, blurred vision, chest pain, headaches, malaise/fatigue, neck pain, orthopnea, palpitations, peripheral edema, PND, shortness of breath or sweats. Agents associated with hypertension include NSAIDs. Risk factors for coronary artery disease include family history. Current antihypertension treatment includes beta blockers and diuretics. The current treatment provides significant improvement. There are no compliance problems.    Hyperlipidemia  This is a chronic problem. The current episode started more than 1 year ago. The problem is controlled. Recent lipid tests were reviewed and are normal. Pertinent negatives include no chest pain or shortness of breath. Current antihyperlipidemic treatment includes statins. The current treatment provides significant improvement of lipids.        The following portions of the patient's history were reviewed and updated as appropriate: allergies, current medications, past family history, past medical history, past social history, past surgical history and problem list.    Review of Systems   Constitutional: Negative for activity change, appetite change, fatigue, fever and malaise/fatigue.        Overall doing well   She is continuing to lose weight    Eyes: Negative for blurred vision and visual disturbance.   Respiratory: Negative for cough, shortness of breath and wheezing.    Cardiovascular: Negative for chest pain, palpitations, orthopnea, leg swelling and PND.   Gastrointestinal: Negative for blood in stool and constipation.   Musculoskeletal: Negative  for neck pain.   Neurological: Negative for dizziness and headaches.   Psychiatric/Behavioral: Negative for sleep disturbance and suicidal ideas. The patient is nervous/anxious.        Objective   Physical Exam  Constitutional:       Appearance: She is well-developed.   HENT:      Head: Normocephalic.      Nose: Nose normal.   Neck:      Thyroid: No thyroid mass.      Vascular: No carotid bruit.      Comments: Repeat bp left arm 108/78  No pedal edema   Cardiovascular:      Rate and Rhythm: Regular rhythm.      Heart sounds: Normal heart sounds. No murmur. No S3 or S4 sounds.    Pulmonary:      Effort: Pulmonary effort is normal.      Breath sounds: Normal breath sounds. No decreased breath sounds, wheezing, rhonchi or rales.   Skin:     General: Skin is warm and dry.   Neurological:      Mental Status: She is alert and oriented to person, place, and time.      Gait: Gait normal.   Psychiatric:         Attention and Perception: Attention and perception normal.         Mood and Affect: Mood and affect normal.         Speech: Speech normal.         Behavior: Behavior normal.         Assessment/Plan   Karishma was seen today for hypertension and hyperlipidemia.    Diagnoses and all orders for this visit:    Essential hypertension  Comments:  well controlled   Orders:  -     CBC & Differential  -     Comprehensive Metabolic Panel    Mixed hyperlipidemia  Comments:  tolerating statin therapy   Orders:  -     Lipid Panel    Elevated fasting glucose  Comments:  will check A1c last 6.3; working on low carb/sugar diet and weight loss   Orders:  -     Hemoglobin A1c         Answers for HPI/ROS submitted by the patient on 10/6/2020   Hypertension  What is the primary reason for your visit?: High Blood Pressure

## 2020-10-25 DIAGNOSIS — I10 ESSENTIAL HYPERTENSION: ICD-10-CM

## 2020-10-26 RX ORDER — ATENOLOL 25 MG/1
25 TABLET ORAL DAILY
Qty: 30 TABLET | Refills: 5 | Status: SHIPPED | OUTPATIENT
Start: 2020-10-26 | End: 2020-12-30

## 2020-12-02 DIAGNOSIS — E78.2 MIXED HYPERLIPIDEMIA: ICD-10-CM

## 2020-12-03 RX ORDER — ATORVASTATIN CALCIUM 10 MG/1
10 TABLET, FILM COATED ORAL EVERY EVENING
Qty: 30 TABLET | Refills: 3 | Status: SHIPPED | OUTPATIENT
Start: 2020-12-03 | End: 2020-12-30

## 2020-12-24 ENCOUNTER — IMMUNIZATION (OUTPATIENT)
Dept: VACCINE CLINIC | Facility: HOSPITAL | Age: 65
End: 2020-12-24

## 2020-12-24 PROCEDURE — 0001A: CPT | Performed by: INTERNAL MEDICINE

## 2020-12-24 PROCEDURE — 91300 HC SARSCOV02 VAC 30MCG/0.3ML IM: CPT | Performed by: INTERNAL MEDICINE

## 2020-12-30 DIAGNOSIS — E78.2 MIXED HYPERLIPIDEMIA: ICD-10-CM

## 2020-12-30 DIAGNOSIS — I10 ESSENTIAL HYPERTENSION: ICD-10-CM

## 2020-12-30 RX ORDER — ATENOLOL 25 MG/1
TABLET ORAL
Qty: 30 TABLET | Refills: 5 | Status: SHIPPED | OUTPATIENT
Start: 2020-12-30 | End: 2020-12-30 | Stop reason: SDUPTHER

## 2020-12-30 RX ORDER — ATORVASTATIN CALCIUM 10 MG/1
10 TABLET, FILM COATED ORAL EVERY EVENING
Qty: 30 TABLET | Refills: 3 | Status: SHIPPED | OUTPATIENT
Start: 2020-12-30 | End: 2020-12-30 | Stop reason: SDUPTHER

## 2020-12-30 RX ORDER — ESCITALOPRAM OXALATE 10 MG/1
10 TABLET ORAL DAILY
Qty: 90 TABLET | Refills: 1 | Status: SHIPPED | OUTPATIENT
Start: 2020-12-30 | End: 2020-12-30 | Stop reason: SDUPTHER

## 2020-12-30 RX ORDER — ESCITALOPRAM OXALATE 10 MG/1
10 TABLET ORAL DAILY
Qty: 90 TABLET | Refills: 1 | Status: SHIPPED | OUTPATIENT
Start: 2020-12-30 | End: 2021-10-04 | Stop reason: SDUPTHER

## 2020-12-30 RX ORDER — ATORVASTATIN CALCIUM 10 MG/1
10 TABLET, FILM COATED ORAL EVERY EVENING
Qty: 30 TABLET | Refills: 3 | Status: SHIPPED | OUTPATIENT
Start: 2020-12-30 | End: 2021-06-03 | Stop reason: SDUPTHER

## 2020-12-30 RX ORDER — ATENOLOL 25 MG/1
25 TABLET ORAL DAILY
Qty: 30 TABLET | Refills: 5 | Status: SHIPPED | OUTPATIENT
Start: 2020-12-30 | End: 2021-04-28 | Stop reason: SDUPTHER

## 2021-01-05 ENCOUNTER — TRANSCRIBE ORDERS (OUTPATIENT)
Dept: ADMINISTRATIVE | Facility: HOSPITAL | Age: 66
End: 2021-01-05

## 2021-01-05 DIAGNOSIS — Z12.39 SCREENING BREAST EXAMINATION: Primary | ICD-10-CM

## 2021-01-15 ENCOUNTER — IMMUNIZATION (OUTPATIENT)
Dept: VACCINE CLINIC | Facility: HOSPITAL | Age: 66
End: 2021-01-15

## 2021-01-15 PROCEDURE — 0001A: CPT | Performed by: INTERNAL MEDICINE

## 2021-01-15 PROCEDURE — 91300 HC SARSCOV02 VAC 30MCG/0.3ML IM: CPT | Performed by: INTERNAL MEDICINE

## 2021-01-15 PROCEDURE — 0002A: CPT | Performed by: INTERNAL MEDICINE

## 2021-03-25 ENCOUNTER — HOSPITAL ENCOUNTER (OUTPATIENT)
Dept: MAMMOGRAPHY | Facility: HOSPITAL | Age: 66
Discharge: HOME | End: 2021-03-25
Admitting: NURSE PRACTITIONER

## 2021-03-25 DIAGNOSIS — Z12.39 SCREENING BREAST EXAMINATION: ICD-10-CM

## 2021-03-25 PROCEDURE — 77063 BREAST TOMOSYNTHESIS BI: CPT

## 2021-03-25 PROCEDURE — 77067 SCR MAMMO BI INCL CAD: CPT

## 2021-04-19 ENCOUNTER — OFFICE VISIT (OUTPATIENT)
Dept: INTERNAL MEDICINE | Facility: CLINIC | Age: 66
End: 2021-04-19

## 2021-04-19 VITALS
HEART RATE: 71 BPM | RESPIRATION RATE: 18 BRPM | DIASTOLIC BLOOD PRESSURE: 72 MMHG | TEMPERATURE: 98 F | SYSTOLIC BLOOD PRESSURE: 133 MMHG | BODY MASS INDEX: 30.61 KG/M2 | WEIGHT: 195 LBS | HEIGHT: 67 IN | OXYGEN SATURATION: 95 %

## 2021-04-19 DIAGNOSIS — E78.2 MIXED HYPERLIPIDEMIA: Chronic | ICD-10-CM

## 2021-04-19 DIAGNOSIS — H01.004 BLEPHARITIS OF LEFT UPPER EYELID, UNSPECIFIED TYPE: ICD-10-CM

## 2021-04-19 DIAGNOSIS — F41.8 DEPRESSION WITH ANXIETY: Chronic | ICD-10-CM

## 2021-04-19 DIAGNOSIS — I10 ESSENTIAL HYPERTENSION: ICD-10-CM

## 2021-04-19 DIAGNOSIS — Z78.0 POSTMENOPAUSAL: ICD-10-CM

## 2021-04-19 DIAGNOSIS — Z76.89 ENCOUNTER TO ESTABLISH CARE: Primary | ICD-10-CM

## 2021-04-19 DIAGNOSIS — Z00.00 HEALTHCARE MAINTENANCE: Chronic | ICD-10-CM

## 2021-04-19 DIAGNOSIS — R73.03 PREDIABETES: ICD-10-CM

## 2021-04-19 LAB
ALBUMIN SERPL-MCNC: 4.5 G/DL (ref 3.5–5.2)
ALBUMIN/GLOB SERPL: 1.8 G/DL
ALP SERPL-CCNC: 78 U/L (ref 39–117)
ALT SERPL-CCNC: 13 U/L (ref 1–33)
AST SERPL-CCNC: 15 U/L (ref 1–32)
BILIRUB SERPL-MCNC: 0.9 MG/DL (ref 0–1.2)
BUN SERPL-MCNC: 17 MG/DL (ref 8–23)
BUN/CREAT SERPL: 23.6 (ref 7–25)
CALCIUM SERPL-MCNC: 9.5 MG/DL (ref 8.6–10.5)
CHLORIDE SERPL-SCNC: 100 MMOL/L (ref 98–107)
CHOLEST SERPL-MCNC: 153 MG/DL (ref 0–200)
CO2 SERPL-SCNC: 28.6 MMOL/L (ref 22–29)
CREAT SERPL-MCNC: 0.72 MG/DL (ref 0.57–1)
ERYTHROCYTE [DISTWIDTH] IN BLOOD BY AUTOMATED COUNT: 12.8 % (ref 12.3–15.4)
GLOBULIN SER CALC-MCNC: 2.5 GM/DL
GLUCOSE SERPL-MCNC: 96 MG/DL (ref 65–99)
HBA1C MFR BLD: 6.1 % (ref 4.8–5.6)
HCT VFR BLD AUTO: 39.8 % (ref 34–46.6)
HDLC SERPL-MCNC: 63 MG/DL (ref 40–60)
HGB BLD-MCNC: 13.2 G/DL (ref 12–15.9)
LDLC SERPL CALC-MCNC: 68 MG/DL (ref 0–100)
MCH RBC QN AUTO: 28.9 PG (ref 26.6–33)
MCHC RBC AUTO-ENTMCNC: 33.2 G/DL (ref 31.5–35.7)
MCV RBC AUTO: 87.3 FL (ref 79–97)
PLATELET # BLD AUTO: 282 10*3/MM3 (ref 140–450)
POTASSIUM SERPL-SCNC: 4.2 MMOL/L (ref 3.5–5.2)
PROT SERPL-MCNC: 7 G/DL (ref 6–8.5)
RBC # BLD AUTO: 4.56 10*6/MM3 (ref 3.77–5.28)
SODIUM SERPL-SCNC: 141 MMOL/L (ref 136–145)
TRIGL SERPL-MCNC: 127 MG/DL (ref 0–150)
TSH SERPL DL<=0.005 MIU/L-ACNC: 1.82 UIU/ML (ref 0.27–4.2)
VLDLC SERPL CALC-MCNC: 22 MG/DL (ref 5–40)
WBC # BLD AUTO: 6.43 10*3/MM3 (ref 3.4–10.8)

## 2021-04-19 PROCEDURE — 99214 OFFICE O/P EST MOD 30 MIN: CPT | Performed by: NURSE PRACTITIONER

## 2021-04-19 RX ORDER — ERYTHROMYCIN 5 MG/G
OINTMENT OPHTHALMIC NIGHTLY
Qty: 3.5 G | Refills: 0 | Status: SHIPPED | OUTPATIENT
Start: 2021-04-19 | End: 2021-09-09

## 2021-04-19 NOTE — ASSESSMENT & PLAN NOTE
Due for DEXA scan which I ordered.    Pap smear due again in 2023.    Continue healthy diet and regular exercise, 20 to 30 minutes/day 5 days/week is recommended.

## 2021-04-19 NOTE — ASSESSMENT & PLAN NOTE
Hypertension is stable, continue atenolol and Dyazide, routine home monitoring for goal of less than 130/80, DASH diet.

## 2021-04-19 NOTE — PROGRESS NOTES
Good evening Ms. Ocasio, your labs are back. Blood count is normal- no anemia, normal platelets and white blood cells. Metabolic panel is normal including kidney function, liver enzymes and electrolytes. Cholesterol panel looks great. Thyroid labs are normal. A1C is stable in the prediabetic range, slightly lower than last check which is good. Continue routine healthy diet lower in carbohydrates and sugars which will help to further lower this. Let me know if you have any questions and have a good day,  CRISTIAN Trejo

## 2021-04-19 NOTE — PROGRESS NOTES
"Chief Complaint  Establish Care (Pt presents here today to establish care.)    Subjective          Karishma Ocasio presents to [facility]  Patient presents to establish care.  This is a 65-year-old female former patient of CRISTIAN Cheng.  This patient is new to me.    She has hypertension and takes atenolol 25 mg daily, Dyazide 37.5-25 mg daily with good compliance, routine home blood pressure readings well controlled in the 120s over 70s and 80s.  No side effects with her medication regimen.    She is prediabetic, last A1c on 10/8/2020 was 6.3.    She has hyperlipidemia treated with atorvastatin 10 mg daily reporting good compliance with this medication.    She has depression/anxiety and takes Lexapro 10 mg daily.  Reports that she started taking this when she went through menopause and when trying to go off of it developed worsening depression symptoms.  Symptoms seem to be quite stable with the Lexapro and she wishes to remain on it.    She is due for DEXA scan.    Overall reports that she is feeling well, denies development of any other new issues today.      Objective   Vital Signs:   /72 (BP Location: Right arm, Patient Position: Sitting, Cuff Size: Adult)   Pulse 71   Temp 98 °F (36.7 °C)   Resp 18   Ht 170.2 cm (67\")   Wt 88.5 kg (195 lb)   SpO2 95%   BMI 30.54 kg/m²     Physical Exam  Vitals and nursing note reviewed.   Constitutional:       General: She is not in acute distress.     Appearance: Normal appearance. She is well-developed. She is not ill-appearing, toxic-appearing or diaphoretic.   HENT:      Head: Normocephalic and atraumatic.      Right Ear: Tympanic membrane, ear canal and external ear normal.      Left Ear: Tympanic membrane, ear canal and external ear normal.   Eyes:      General:         Left eye: Hordeolum (  Upper) present.     Extraocular Movements: Extraocular movements intact.      Right eye: Normal extraocular motion.      Left " eye: Normal extraocular motion.      Conjunctiva/sclera: Conjunctivae normal.      Pupils: Pupils are equal, round, and reactive to light.   Neck:      Vascular: No carotid bruit.   Cardiovascular:      Rate and Rhythm: Normal rate and regular rhythm.      Pulses: Normal pulses.      Heart sounds: Normal heart sounds.      Comments: No peripheral edema  Pulmonary:      Effort: Pulmonary effort is normal. No respiratory distress.      Breath sounds: Normal breath sounds. No stridor. No wheezing, rhonchi or rales.   Chest:      Chest wall: No tenderness.   Abdominal:      General: Bowel sounds are normal. There is no distension.      Palpations: Abdomen is soft. There is no mass.      Tenderness: There is no abdominal tenderness. There is no right CVA tenderness, left CVA tenderness, guarding or rebound.      Hernia: No hernia is present.   Musculoskeletal:         General: Normal range of motion.      Cervical back: Normal range of motion and neck supple. No rigidity or tenderness.   Lymphadenopathy:      Cervical: No cervical adenopathy.   Skin:     General: Skin is warm and dry.      Capillary Refill: Capillary refill takes less than 2 seconds.   Neurological:      General: No focal deficit present.      Mental Status: She is alert and oriented to person, place, and time. Mental status is at baseline.   Psychiatric:         Mood and Affect: Mood normal.         Behavior: Behavior normal.         Thought Content: Thought content normal.         Judgment: Judgment normal.        Result Review :   The following data was reviewed by: CRISTIAN Trejo on 04/19/2021:  Common labs    Common Labsle 6/4/20 6/4/20 6/4/20 6/4/20 10/8/20 10/8/20 10/8/20 10/8/20    0951 0951 0951 0951 0924 0924 0924 0924   Glucose 107 (A)     104 (A)     BUN 15     16     Creatinine 0.72     0.77     eGFR Non African Am 82     76     eGFR  Am 99     92     Sodium 137     139     Potassium 4.3     4.2     Chloride 101     104      Calcium 9.8     9.4     Total Protein 7.4     7.1     Albumin 4.40     4.60     Total Bilirubin 0.6     0.7     Alkaline Phosphatase 74     80     AST (SGOT) 14     19     ALT (SGPT) 17     19     WBC   6.68  7.43      Hemoglobin   12.7  12.7      Hematocrit   38.7  38.1      Platelets   275  307      Total Cholesterol  144     148    Triglycerides  156 (A)     111    HDL Cholesterol  57     62 (A)    LDL Cholesterol   56     66    Hemoglobin A1C    6.30 (A)    6.30 (A)   (A) Abnormal value       Comments are available for some flowsheets but are not being displayed.           Office Visit with Radha Cervantes APRN (10/08/2020)    Current outpatient and discharge medications have been reconciled for the patient.  Reviewed by: CRISTIAN Trejo           Assessment and Plan    Diagnoses and all orders for this visit:    1. Encounter to establish care (Primary)  Comments:  Reviewed medical, surgical, social history.    2. Essential hypertension  Assessment & Plan:  Hypertension is stable, continue atenolol and Dyazide, routine home monitoring for goal of less than 130/80, DASH diet.    Orders:  -     CBC No Differential  -     Comprehensive metabolic panel  -     TSH Rfx On Abnormal To Free T4    3. Prediabetes  Assessment & Plan:  Continue healthy diet and regular exercise.  A1c today we will adjust therapy if needed.    Orders:  -     Hemoglobin A1c    4. Mixed hyperlipidemia  Assessment & Plan:  Continue atorvastatin 10 mg daily.  Lipid panel today and we will adjust therapy if needed.    Orders:  -     Lipid panel    5. Depression with anxiety  Assessment & Plan:  Stable with Lexapro 10 mg daily, continue current therapy.      6. Healthcare maintenance  Assessment & Plan:  Due for DEXA scan which I ordered.    Pap smear due again in 2023.    Continue healthy diet and regular exercise, 20 to 30 minutes/day 5 days/week is recommended.        7. Postmenopausal  Comments:  DEXA scan ordered for which she is  due.  Orders:  -     DEXA Bone Density Axial    8. Blepharitis of left upper eyelid, unspecified type  Comments:  Erythromycin ophthalmic ointment prescribed as well as warm compresses encouraged.  Orders:  -     erythromycin (ROMYCIN) 5 MG/GM ophthalmic ointment; Administer  into the left eye Every Night.  Dispense: 3.5 g; Refill: 0    We will contact patient with lab results and any further recommendations.  Follow-up as needed and I will see her back in 6 months for a physical.      Follow Up   Return in about 6 months (around 10/19/2021) for Annual physical.  Patient was given instructions and counseling regarding her condition or for health maintenance advice. Please see specific information pulled into the AVS if appropriate.

## 2021-04-28 DIAGNOSIS — I10 ESSENTIAL HYPERTENSION: ICD-10-CM

## 2021-04-28 RX ORDER — TRIAMTERENE AND HYDROCHLOROTHIAZIDE 37.5; 25 MG/1; MG/1
1 CAPSULE ORAL DAILY
Qty: 90 CAPSULE | Refills: 1 | Status: SHIPPED | OUTPATIENT
Start: 2021-04-28 | End: 2021-07-30 | Stop reason: SDUPTHER

## 2021-04-28 RX ORDER — ATENOLOL 25 MG/1
25 TABLET ORAL DAILY
Qty: 90 TABLET | Refills: 1 | Status: SHIPPED | OUTPATIENT
Start: 2021-04-28 | End: 2021-10-26 | Stop reason: SDUPTHER

## 2021-06-03 DIAGNOSIS — E78.2 MIXED HYPERLIPIDEMIA: ICD-10-CM

## 2021-06-03 RX ORDER — ATORVASTATIN CALCIUM 10 MG/1
10 TABLET, FILM COATED ORAL EVERY EVENING
Qty: 90 TABLET | Refills: 1 | Status: SHIPPED | OUTPATIENT
Start: 2021-06-03 | End: 2021-10-26 | Stop reason: SDUPTHER

## 2021-06-11 DIAGNOSIS — H60.543 DERMATITIS OF EAR CANAL, BILATERAL: Primary | ICD-10-CM

## 2021-06-11 RX ORDER — NEOMYCIN SULFATE, POLYMYXIN B SULFATE, HYDROCORTISONE 3.5; 10000; 1 MG/ML; [USP'U]/ML; MG/ML
3 SOLUTION/ DROPS AURICULAR (OTIC) 4 TIMES DAILY
Qty: 10 ML | Refills: 1 | Status: SHIPPED | OUTPATIENT
Start: 2021-06-11

## 2021-07-30 ENCOUNTER — TELEPHONE (OUTPATIENT)
Dept: INTERNAL MEDICINE | Facility: CLINIC | Age: 66
End: 2021-07-30

## 2021-07-30 DIAGNOSIS — I10 ESSENTIAL HYPERTENSION: ICD-10-CM

## 2021-07-30 RX ORDER — TRIAMTERENE AND HYDROCHLOROTHIAZIDE 37.5; 25 MG/1; MG/1
1 CAPSULE ORAL DAILY
Qty: 90 CAPSULE | Refills: 1 | Status: SHIPPED | OUTPATIENT
Start: 2021-07-30 | End: 2022-01-25 | Stop reason: SDUPTHER

## 2021-07-30 NOTE — TELEPHONE ENCOUNTER
Caller: Kraishma Ocasio    Relationship: Self    Best call back number: [phone]    Medication needed: triamterene-hydrochlorothiazide (DYAZIDE) 37.5-25 MG per capsule      When do you need the refill by: 07/30/21    Does the patient have less than a 3 day supply:  [x] Yes  [] No    What is the patient's preferred pharmacy:    [facility]  [phone]    PLEASE ADVISE

## 2021-08-17 DIAGNOSIS — L23.7 ALLERGIC CONTACT DERMATITIS DUE TO PLANTS, EXCEPT FOOD: Primary | ICD-10-CM

## 2021-08-17 RX ORDER — METHYLPREDNISOLONE 4 MG/1
TABLET ORAL
Qty: 21 TABLET | Refills: 0 | Status: SHIPPED | OUTPATIENT
Start: 2021-08-17 | End: 2021-08-23

## 2021-09-09 ENCOUNTER — OFFICE VISIT (OUTPATIENT)
Dept: INTERNAL MEDICINE | Facility: CLINIC | Age: 66
End: 2021-09-09

## 2021-09-09 VITALS
DIASTOLIC BLOOD PRESSURE: 82 MMHG | RESPIRATION RATE: 15 BRPM | SYSTOLIC BLOOD PRESSURE: 120 MMHG | WEIGHT: 205 LBS | OXYGEN SATURATION: 99 % | HEART RATE: 83 BPM | BODY MASS INDEX: 32.18 KG/M2 | TEMPERATURE: 98 F | HEIGHT: 67 IN

## 2021-09-09 DIAGNOSIS — H65.193 ACUTE MIDDLE EAR EFFUSION, BILATERAL: Primary | ICD-10-CM

## 2021-09-09 PROCEDURE — 99213 OFFICE O/P EST LOW 20 MIN: CPT | Performed by: NURSE PRACTITIONER

## 2021-09-09 NOTE — PROGRESS NOTES
"Chief Complaint  Ear Fullness (Bilateral x 2-3 weeks)    Subjective          Karishma Ocasio presents to [facility]  Patient presents for evaluation of bilateral plugged ear sensation.  This is a 66-year-old female.  Symptoms are present in the morning and gradually decreased throughout the day.  Last week she had some minor postnasal drip and throat clearing but this has resolved.  The ear pressure is sometimes positional last week she was feeling some \"popping\" when lying on her left ear.  This has gradually improved.  Worse on the left side.  She endorses itchy ears throughout the summer this year.  She was taking Claritin long-term until the beginning of the summer when she quit, thinking that it may have lost its efficacy.  She has taken 1 dose of Allegra this week.  No shortness of breath, cough, chest discomfort or chest pain, no fever or chills.  She is fully vaccinated against COVID-19 and has had no known exposure.  No outright ear pain.  She denies development of any other new issues today.      Objective   Vital Signs:   /82 (BP Location: Left arm, Patient Position: Sitting, Cuff Size: Adult)   Pulse 83   Temp 98 °F (36.7 °C) (Temporal)   Resp 15   Ht 170.2 cm (67\")   Wt 93 kg (205 lb)   SpO2 99%   BMI 32.11 kg/m²     Physical Exam  Vitals and nursing note reviewed.   Constitutional:       General: She is not in acute distress.     Appearance: Normal appearance. She is well-developed. She is not ill-appearing, toxic-appearing or diaphoretic.   HENT:      Head: Normocephalic and atraumatic.      Right Ear: External ear normal. A middle ear effusion is present. Tympanic membrane is not erythematous, retracted or bulging.      Left Ear: External ear normal. A middle ear effusion is present. Tympanic membrane is not erythematous, retracted or bulging.   Eyes:      Pupils: Pupils are equal, round, and reactive to light.   Neck:      Vascular: No carotid bruit. "   Cardiovascular:      Rate and Rhythm: Normal rate and regular rhythm.      Pulses: Normal pulses.      Heart sounds: Normal heart sounds.   Pulmonary:      Effort: Pulmonary effort is normal. No respiratory distress.      Breath sounds: Normal breath sounds. No stridor. No wheezing, rhonchi or rales.   Chest:      Chest wall: No tenderness.   Abdominal:      General: Bowel sounds are normal. There is no distension.      Palpations: Abdomen is soft.      Tenderness: There is no abdominal tenderness.   Musculoskeletal:         General: Normal range of motion.      Cervical back: Normal range of motion and neck supple. No rigidity or tenderness.   Lymphadenopathy:      Cervical: No cervical adenopathy.   Skin:     General: Skin is warm and dry.      Capillary Refill: Capillary refill takes less than 2 seconds.   Neurological:      General: No focal deficit present.      Mental Status: She is alert and oriented to person, place, and time. Mental status is at baseline.   Psychiatric:         Mood and Affect: Mood normal.         Behavior: Behavior normal.         Thought Content: Thought content normal.         Judgment: Judgment normal.        Result Review :   The following data was reviewed by: CRISTIAN Trejo on 09/09/2021:  Common labs    Common Labsle 10/8/20 10/8/20 10/8/20 10/8/20 4/19/21 4/19/21 4/19/21 4/19/21    0924 0924 0924 0924 0838 0838 0838 0838   Glucose  104 (A)    96     BUN  16    17     Creatinine  0.77    0.72     eGFR Non  Am  76    82     eGFR  Am  92    99     Sodium  139    141     Potassium  4.2    4.2     Chloride  104    100     Calcium  9.4    9.5     Total Protein  7.1    7.0     Albumin  4.60    4.50     Total Bilirubin  0.7    0.9     Alkaline Phosphatase  80    78     AST (SGOT)  19    15     ALT (SGPT)  19    13     WBC 7.43    6.43      Hemoglobin 12.7    13.2      Hematocrit 38.1    39.8      Platelets 307    282      Total Cholesterol   148    153     Triglycerides   111    127    HDL Cholesterol   62 (A)    63 (A)    LDL Cholesterol    66    68    Hemoglobin A1C    6.30 (A)    6.10 (A)   (A) Abnormal value       Comments are available for some flowsheets but are not being displayed.           Current outpatient and discharge medications have been reconciled for the patient.  Reviewed by: CRISTIAN Trejo           Assessment and Plan    Diagnoses and all orders for this visit:    1. Acute middle ear effusion, bilateral (Primary)      Add Flonase daily, continue with the daily antihistamine.  She can continue with Allegra or try Xyzal daily.    She will call back within 1 to 2 weeks if symptoms persist or worsen and we can try Astelin instead.    Follow-up as needed if symptoms persist or worsen and at next scheduled office visit.      Follow Up   Return if symptoms worsen or fail to improve, for Next scheduled follow up.  Patient was given instructions and counseling regarding her condition or for health maintenance advice. Please see specific information pulled into the AVS if appropriate.

## 2021-10-04 DIAGNOSIS — I10 ESSENTIAL HYPERTENSION: ICD-10-CM

## 2021-10-05 RX ORDER — ESCITALOPRAM OXALATE 10 MG/1
10 TABLET ORAL DAILY
Qty: 90 TABLET | Refills: 1 | Status: SHIPPED | OUTPATIENT
Start: 2021-10-05 | End: 2022-03-28 | Stop reason: SDUPTHER

## 2021-10-26 ENCOUNTER — OFFICE VISIT (OUTPATIENT)
Dept: INTERNAL MEDICINE | Facility: CLINIC | Age: 66
End: 2021-10-26

## 2021-10-26 VITALS
SYSTOLIC BLOOD PRESSURE: 102 MMHG | TEMPERATURE: 98.2 F | WEIGHT: 209 LBS | HEART RATE: 69 BPM | OXYGEN SATURATION: 95 % | DIASTOLIC BLOOD PRESSURE: 68 MMHG | RESPIRATION RATE: 18 BRPM | HEIGHT: 67 IN | BODY MASS INDEX: 32.8 KG/M2

## 2021-10-26 DIAGNOSIS — Z00.00 HEALTHCARE MAINTENANCE: Chronic | ICD-10-CM

## 2021-10-26 DIAGNOSIS — R73.03 PREDIABETES: Chronic | ICD-10-CM

## 2021-10-26 DIAGNOSIS — F41.8 DEPRESSION WITH ANXIETY: Chronic | ICD-10-CM

## 2021-10-26 DIAGNOSIS — E78.2 MIXED HYPERLIPIDEMIA: Chronic | ICD-10-CM

## 2021-10-26 DIAGNOSIS — Z00.00 ANNUAL PHYSICAL EXAM: Primary | ICD-10-CM

## 2021-10-26 DIAGNOSIS — I10 PRIMARY HYPERTENSION: Chronic | ICD-10-CM

## 2021-10-26 PROCEDURE — 77080 DXA BONE DENSITY AXIAL: CPT | Performed by: NURSE PRACTITIONER

## 2021-10-26 PROCEDURE — 99396 PREV VISIT EST AGE 40-64: CPT | Performed by: NURSE PRACTITIONER

## 2021-10-26 RX ORDER — BETAMETHASONE DIPROPIONATE 0.5 MG/G
CREAM TOPICAL DAILY PRN
Qty: 15 G | Refills: 1 | Status: SHIPPED | OUTPATIENT
Start: 2021-10-26 | End: 2022-12-28 | Stop reason: SDUPTHER

## 2021-10-26 RX ORDER — ATORVASTATIN CALCIUM 10 MG/1
10 TABLET, FILM COATED ORAL EVERY EVENING
Qty: 90 TABLET | Refills: 1 | Status: SHIPPED | OUTPATIENT
Start: 2021-10-26 | End: 2022-03-28 | Stop reason: SDUPTHER

## 2021-10-26 RX ORDER — ATENOLOL 25 MG/1
25 TABLET ORAL DAILY
Qty: 90 TABLET | Refills: 1 | Status: SHIPPED | OUTPATIENT
Start: 2021-10-26 | End: 2022-03-28 | Stop reason: SDUPTHER

## 2021-10-26 NOTE — PROGRESS NOTES
"Chief Complaint  Annual Exam (Pt presents here today for her annual physical.)    Subjective          Karishma Ocasio presents to [facility]  Patient presents for CPE. This is a 66 YOF.     She has depression and anxiety well controlled with Lexapro 10 mg daily.    She has a history of prediabetes, last A1c 4/19/2021 was 6.1.    She has hyperlipidemia and endorses good compliance with atorvastatin 10 mg daily.    She has hypertension well controlled with Dyazide 37.5-25 mg daily.    She does not drink alcohol regularly.  She quit smoking in 2000.    She endorses a well-balanced diet but states she has \"gone off the wagon\" as far as dietary modifications and exercise at times.  She does have a membership to [employer] and uses the bike and weights often.    Up-to-date on dental, vision exams.  She sees Dr. Shawna Dominguez for ophthalmology.    She is up-to-date on her flu shot.  She had a DEXA scan in the office today.    Denies development of any other new issues today.      Objective   Vital Signs:   /68 (BP Location: Left arm, Patient Position: Sitting, Cuff Size: Large Adult)   Pulse 69   Temp 98.2 °F (36.8 °C)   Resp 18   Ht 170.2 cm (67\")   Wt 94.8 kg (209 lb)   SpO2 95%   BMI 32.73 kg/m²     Physical Exam  Vitals and nursing note reviewed.   Constitutional:       General: She is not in acute distress.     Appearance: Normal appearance. She is well-developed. She is not ill-appearing, toxic-appearing or diaphoretic.   HENT:      Head: Normocephalic and atraumatic.      Right Ear: Tympanic membrane, ear canal and external ear normal.      Left Ear: Tympanic membrane, ear canal and external ear normal.   Eyes:      Pupils: Pupils are equal, round, and reactive to light.   Neck:      Vascular: No carotid bruit.   Cardiovascular:      Rate and Rhythm: Normal rate and regular rhythm.      Pulses: Normal pulses.      Heart sounds: Normal heart sounds.      Comments: No " peripheral edema.  Pulmonary:      Effort: Pulmonary effort is normal. No respiratory distress.      Breath sounds: Normal breath sounds. No stridor. No wheezing, rhonchi or rales.   Chest:      Chest wall: No tenderness.   Abdominal:      General: Bowel sounds are normal. There is no distension.      Palpations: Abdomen is soft. There is no mass.      Tenderness: There is no abdominal tenderness. There is no right CVA tenderness, left CVA tenderness, guarding or rebound.      Hernia: No hernia is present.   Musculoskeletal:         General: Normal range of motion.      Cervical back: Normal range of motion and neck supple. No rigidity or tenderness.   Lymphadenopathy:      Cervical: No cervical adenopathy.   Skin:     General: Skin is warm and dry.      Capillary Refill: Capillary refill takes less than 2 seconds.   Neurological:      General: No focal deficit present.      Mental Status: She is alert and oriented to person, place, and time. Mental status is at baseline.   Psychiatric:         Mood and Affect: Mood normal.         Behavior: Behavior normal.         Thought Content: Thought content normal.         Judgment: Judgment normal.        Result Review :   The following data was reviewed by: CRISTIAN Trejo on 10/26/2021:  Common labs    Common Labsle 4/19/21 4/19/21 4/19/21 4/19/21    0838 [id number] [id number]   Glucose  96     BUN  17     Creatinine  0.72     eGFR Non  Am  82     eGFR African Am  99     Sodium  141     Potassium  4.2     Chloride  100     Calcium  9.5     Total Protein  7.0     Albumin  4.50     Total Bilirubin  0.9     Alkaline Phosphatase  78     AST (SGOT)  15     ALT (SGPT)  13     WBC 6.43      Hemoglobin 13.2      Hematocrit 39.8      Platelets 282      Total Cholesterol   153    Triglycerides   127    HDL Cholesterol   63 (A)    LDL Cholesterol    68    Hemoglobin A1C    6.10 (A)   (A) Abnormal value       Comments are available for some flowsheets but are not being  displayed.           Current outpatient and discharge medications have been reconciled for the patient.  Reviewed by: CRISTIAN Trejo           Assessment and Plan    Diagnoses and all orders for this visit:    1. Annual physical exam (Primary)    2. Primary hypertension  Assessment & Plan:  Hypertension is stable, continue Dyazide.    Orders:  -     CBC No Differential  -     Comprehensive metabolic panel  -     TSH Rfx On Abnormal To Free T4  -     atenolol (TENORMIN) 25 MG tablet; Take 1 tablet by mouth Daily.  Dispense: 90 tablet; Refill: 1    3. Mixed hyperlipidemia  Assessment & Plan:  Continue atorvastatin.  Lipid panel today and we will adjust therapy if needed.    Orders:  -     Lipid panel  -     atorvastatin (LIPITOR) 10 MG tablet; Take 1 tablet by mouth Every Evening.  Dispense: 90 tablet; Refill: 1    4. Prediabetes  Assessment & Plan:  Discussed well-balanced diet low in carbs, sugars.  A1c today we will adjust therapy if needed.    Orders:  -     Hemoglobin A1c    5. Depression with anxiety  Assessment & Plan:  Patient's depression and anxiety are stable, continue Lexapro.      6. Healthcare maintenance  Assessment & Plan:  Pap smear up-to-date.    DEXA scan done today, no evidence of osteoporosis of the bilateral hips or lumbar spine.  Normal bone density, repeat in 2 years.  Discussed recommendation for a multivitamin with calcium/D.    Regular exercise, goal of 30 minutes/day 5 days/week is recommended.    She will remain up-to-date on dental and vision exams.      Other orders  -     betamethasone dipropionate 0.05 % cream; Apply  topically to the appropriate area as directed Daily As Needed for Irritation or Rash.  Dispense: 15 g; Refill: 1    We will contact patient with lab results and any further recommendations.  Follow-up as needed and I will see her back in 6 months for recheck of chronic conditions.    Follow Up   Return in about 6 months (around 4/26/2022).  Patient was given  instructions and counseling regarding her condition or for health maintenance advice. Please see specific information pulled into the AVS if appropriate.

## 2021-10-26 NOTE — ASSESSMENT & PLAN NOTE
Pap smear up-to-date.    DEXA scan done today, no evidence of osteoporosis of the bilateral hips or lumbar spine.  Normal bone density, repeat in 2 years.  Discussed recommendation for a multivitamin with calcium/D.    Regular exercise, goal of 30 minutes/day 5 days/week is recommended.    She will remain up-to-date on dental and vision exams.

## 2021-10-27 LAB
ALBUMIN SERPL-MCNC: 4.6 G/DL (ref 3.8–4.8)
ALBUMIN/GLOB SERPL: 1.6 {RATIO} (ref 1.2–2.2)
ALP SERPL-CCNC: 80 IU/L (ref 44–121)
ALT SERPL-CCNC: 12 IU/L (ref 0–32)
AST SERPL-CCNC: 14 IU/L (ref 0–40)
BILIRUB SERPL-MCNC: 0.7 MG/DL (ref 0–1.2)
BUN SERPL-MCNC: 18 MG/DL (ref 8–27)
BUN/CREAT SERPL: 23 (ref 12–28)
CALCIUM SERPL-MCNC: 9.7 MG/DL (ref 8.7–10.3)
CHLORIDE SERPL-SCNC: 102 MMOL/L (ref 96–106)
CHOLEST SERPL-MCNC: 157 MG/DL (ref 100–199)
CO2 SERPL-SCNC: 25 MMOL/L (ref 20–29)
CREAT SERPL-MCNC: 0.77 MG/DL (ref 0.57–1)
ERYTHROCYTE [DISTWIDTH] IN BLOOD BY AUTOMATED COUNT: 12.1 % (ref 11.7–15.4)
GLOBULIN SER CALC-MCNC: 2.8 G/DL (ref 1.5–4.5)
GLUCOSE SERPL-MCNC: 116 MG/DL (ref 65–99)
HBA1C MFR BLD: 6.5 % (ref 4.8–5.6)
HCT VFR BLD AUTO: 40.3 % (ref 34–46.6)
HDLC SERPL-MCNC: 62 MG/DL
HGB BLD-MCNC: 13 G/DL (ref 11.1–15.9)
LDLC SERPL CALC-MCNC: 70 MG/DL (ref 0–99)
Lab: NORMAL
MCH RBC QN AUTO: 28.1 PG (ref 26.6–33)
MCHC RBC AUTO-ENTMCNC: 32.3 G/DL (ref 31.5–35.7)
MCV RBC AUTO: 87 FL (ref 79–97)
PLATELET # BLD AUTO: 287 X10E3/UL (ref 150–450)
POTASSIUM SERPL-SCNC: 4.7 MMOL/L (ref 3.5–5.2)
PROT SERPL-MCNC: 7.4 G/DL (ref 6–8.5)
RBC # BLD AUTO: 4.63 X10E6/UL (ref 3.77–5.28)
SODIUM SERPL-SCNC: 141 MMOL/L (ref 134–144)
TRIGL SERPL-MCNC: 144 MG/DL (ref 0–149)
TSH SERPL DL<=0.005 MIU/L-ACNC: 1.48 UIU/ML (ref 0.45–4.5)
VLDLC SERPL CALC-MCNC: 25 MG/DL (ref 5–40)
WBC # BLD AUTO: 7 X10E3/UL (ref 3.4–10.8)

## 2021-10-27 NOTE — PROGRESS NOTES
Good afternoon Ms. Ocasio, labs are back.  Blood count looks good, no anemia, normal white blood cells and platelets.  Metabolic panel is stable except for elevated blood sugar at 116.  Electrolytes, kidney function and liver enzymes are stable.  Cholesterol panel looks good overall.  Thyroid numbers are stable.  Your A1c has increased into the diabetic range, 6.5.  Anything 6.5 or greater is considered truly diabetic.  We need to keep the A1c less than 7.0, at that point we would want to start on medication to keep diabetes well controlled.  Please let me know if you would be open to diabetic education classes to help learn about dietary modifications to make regarding diabetes.  [facility] has these resources available and I can refer you if you would be agreeable to this.  Please watch your intake of carbs and sugars in the diet.  We will recheck your A1c at next visit.  Let me know if you have any questions and have a great day,CRISTIAN Trejo

## 2021-11-02 ENCOUNTER — IMMUNIZATION (OUTPATIENT)
Dept: VACCINE CLINIC | Facility: HOSPITAL | Age: 66
End: 2021-11-02

## 2021-11-02 PROCEDURE — 91300 HC SARSCOV02 VAC 30MCG/0.3ML IM: CPT | Performed by: INTERNAL MEDICINE

## 2021-11-02 PROCEDURE — 0004A ADM SARSCOV2 30MCG/0.3ML BOOSTER: CPT | Performed by: INTERNAL MEDICINE

## 2022-01-25 DIAGNOSIS — I10 ESSENTIAL HYPERTENSION: ICD-10-CM

## 2022-01-26 RX ORDER — TRIAMTERENE AND HYDROCHLOROTHIAZIDE 37.5; 25 MG/1; MG/1
1 CAPSULE ORAL DAILY
Qty: 90 CAPSULE | Refills: 1 | Status: SHIPPED | OUTPATIENT
Start: 2022-01-26 | End: 2022-07-07 | Stop reason: SDUPTHER

## 2022-03-28 DIAGNOSIS — I10 ESSENTIAL HYPERTENSION: ICD-10-CM

## 2022-03-28 DIAGNOSIS — I10 PRIMARY HYPERTENSION: Chronic | ICD-10-CM

## 2022-03-28 DIAGNOSIS — E78.2 MIXED HYPERLIPIDEMIA: Chronic | ICD-10-CM

## 2022-03-29 RX ORDER — ATENOLOL 25 MG/1
25 TABLET ORAL DAILY
Qty: 90 TABLET | Refills: 1 | Status: SHIPPED | OUTPATIENT
Start: 2022-03-29 | End: 2022-10-24

## 2022-03-29 RX ORDER — ATORVASTATIN CALCIUM 10 MG/1
10 TABLET, FILM COATED ORAL EVERY EVENING
Qty: 90 TABLET | Refills: 1 | Status: SHIPPED | OUTPATIENT
Start: 2022-03-29 | End: 2022-12-05

## 2022-03-29 RX ORDER — ESCITALOPRAM OXALATE 10 MG/1
10 TABLET ORAL DAILY
Qty: 90 TABLET | Refills: 1 | Status: SHIPPED | OUTPATIENT
Start: 2022-03-29 | End: 2022-10-03

## 2022-06-27 ENCOUNTER — OFFICE VISIT (OUTPATIENT)
Dept: INTERNAL MEDICINE | Facility: CLINIC | Age: 67
End: 2022-06-27

## 2022-06-27 VITALS
TEMPERATURE: 98.3 F | DIASTOLIC BLOOD PRESSURE: 69 MMHG | HEART RATE: 68 BPM | OXYGEN SATURATION: 95 % | WEIGHT: 213 LBS | SYSTOLIC BLOOD PRESSURE: 116 MMHG | BODY MASS INDEX: 33.43 KG/M2 | HEIGHT: 67 IN | RESPIRATION RATE: 18 BRPM

## 2022-06-27 DIAGNOSIS — E78.2 MIXED HYPERLIPIDEMIA: Primary | Chronic | ICD-10-CM

## 2022-06-27 DIAGNOSIS — Z00.00 HEALTHCARE MAINTENANCE: Chronic | ICD-10-CM

## 2022-06-27 DIAGNOSIS — R73.03 PREDIABETES: Chronic | ICD-10-CM

## 2022-06-27 DIAGNOSIS — Z12.31 ENCOUNTER FOR SCREENING MAMMOGRAM FOR MALIGNANT NEOPLASM OF BREAST: ICD-10-CM

## 2022-06-27 DIAGNOSIS — F41.8 DEPRESSION WITH ANXIETY: Chronic | ICD-10-CM

## 2022-06-27 DIAGNOSIS — I10 PRIMARY HYPERTENSION: Chronic | ICD-10-CM

## 2022-06-27 PROCEDURE — 99214 OFFICE O/P EST MOD 30 MIN: CPT | Performed by: NURSE PRACTITIONER

## 2022-06-27 NOTE — ASSESSMENT & PLAN NOTE
Continue well-balanced diet, discussed low concentrated sweets diet.  A1c today and we will adjust therapy if needed.

## 2022-06-27 NOTE — PROGRESS NOTES
"Chief Complaint  Hyperlipidemia and Hypertension (Pt presents here today for a 6 month follow up for health maintenance.)    Subjective        Karishma Ocasio presents to [facility]  Patient presents for a 6-month follow-up on chronic conditions.  This is a 66-year-old female.    She has depression/anxiety and takes Lexapro 10 mg daily with good compliance and effectiveness.    She has well-controlled hypertension and takes atenolol 25 mg daily and Dyazide 37.5-25 mg daily.    She is prediabetic.    She has hyperlipidemia taking atorvastatin 10 mg daily.    Overall reports that she is doing well and denies development of other new issues today.      Objective   Vital Signs:  /69 (BP Location: Right arm, Patient Position: Sitting, Cuff Size: Large Adult)   Pulse 68   Temp 98.3 °F (36.8 °C)   Resp 18   Ht 170.2 cm (67\")   Wt 96.6 kg (213 lb)   SpO2 95%   BMI 33.36 kg/m²   Estimated body mass index is 33.36 kg/m² as calculated from the following:    Height as of this encounter: 170.2 cm (67\").    Weight as of this encounter: 96.6 kg (213 lb).          Physical Exam  Vitals and nursing note reviewed.   Constitutional:       General: She is not in acute distress.     Appearance: Normal appearance. She is well-developed. She is not ill-appearing, toxic-appearing or diaphoretic.   HENT:      Head: Normocephalic and atraumatic.      Right Ear: Tympanic membrane, ear canal and external ear normal.      Left Ear: Tympanic membrane, ear canal and external ear normal.   Eyes:      Pupils: Pupils are equal, round, and reactive to light.   Neck:      Vascular: No carotid bruit.   Cardiovascular:      Rate and Rhythm: Normal rate and regular rhythm.      Pulses: Normal pulses.      Heart sounds: Normal heart sounds.      Comments: No peripheral edema  Pulmonary:      Effort: Pulmonary effort is normal. No respiratory distress.      Breath sounds: Normal breath sounds. No stridor. No " wheezing, rhonchi or rales.   Chest:      Chest wall: No tenderness.   Abdominal:      General: Bowel sounds are normal. There is no distension.      Palpations: Abdomen is soft. There is no mass.      Tenderness: There is no abdominal tenderness. There is no right CVA tenderness, left CVA tenderness, guarding or rebound.      Hernia: No hernia is present.   Musculoskeletal:         General: Normal range of motion.      Cervical back: Normal range of motion and neck supple. No rigidity or tenderness.   Lymphadenopathy:      Cervical: No cervical adenopathy.   Skin:     General: Skin is warm and dry.      Capillary Refill: Capillary refill takes less than 2 seconds.   Neurological:      General: No focal deficit present.      Mental Status: She is alert and oriented to person, place, and time. Mental status is at baseline.   Psychiatric:         Mood and Affect: Mood normal.         Behavior: Behavior normal.         Thought Content: Thought content normal.         Judgment: Judgment normal.        Result Review :  The following data was reviewed by: CRISTIAN Trejo on 06/27/2022:  Common labs    Common Labsle 10/26/21 10/26/21 10/26/21 10/26/21    0000 0000 0000 0000   Glucose  116 (A)     BUN  18     Creatinine  0.77     eGFR Non  Am  82     eGFR African Am  94     Sodium  141     Potassium  4.7     Chloride  102     Calcium  9.7     Total Protein  7.4     Albumin  4.6     Total Bilirubin  0.7     Alkaline Phosphatase  80     AST (SGOT)  14     ALT (SGPT)  12     WBC 7.0      Hemoglobin 13.0      Hematocrit 40.3      Platelets 287      Total Cholesterol   157    Triglycerides   144    HDL Cholesterol   62    LDL Cholesterol    70    Hemoglobin A1C    6.5 (A)   (A) Abnormal value       Comments are available for some flowsheets but are not being displayed.           Current outpatient and discharge medications have been reconciled for the patient.  Reviewed by: CRISTIAN Trejo           Assessment  and Plan   Diagnoses and all orders for this visit:    1. Mixed hyperlipidemia (Primary)  Assessment & Plan:  Continue atorvastatin.  Lipid panel today and we will adjust therapy if needed.    Orders:  -     Lipid panel    2. Primary hypertension  Assessment & Plan:  Hypertension is stable, continue atenolol and Dyazide, routine home monitoring for goal of less than 140/80 along with DASH diet.    Orders:  -     CBC No Differential  -     Comprehensive metabolic panel  -     TSH Rfx On Abnormal To Free T4    3. Prediabetes  Assessment & Plan:  Continue well-balanced diet, discussed low concentrated sweets diet.  A1c today and we will adjust therapy if needed.    Orders:  -     Hemoglobin A1c    4. Healthcare maintenance  Assessment & Plan:  Due for screening mammogram which I ordered.    Up-to-date on COVID-19 vaccines.  She is going to pursue the fourth Pfizer in the coming months.    Orders:  -     Mammo Screening Bilateral With CAD; Future    5. Depression with anxiety  Assessment & Plan:  Patient's depression and anxiety are stable, continue Lexapro.      6. Encounter for screening mammogram for malignant neoplasm of breast  Comments:  Ordered.  Orders:  -     Mammo Screening Bilateral With CAD; Future         We will contact patient with lab results and any further recommendations.  Follow-up as needed and I will see her back in 6 months for a Welcome to Medicare visit.    Follow Up   Return in about 6 months (around 12/27/2022) for Medicare Wellness.  Patient was given instructions and counseling regarding her condition or for health maintenance advice. Please see specific information pulled into the AVS if appropriate.

## 2022-06-27 NOTE — ASSESSMENT & PLAN NOTE
Due for screening mammogram which I ordered.    Up-to-date on COVID-19 vaccines.  She is going to pursue the fourth Pfizer in the coming months.

## 2022-06-27 NOTE — ASSESSMENT & PLAN NOTE
Hypertension is stable, continue atenolol and Dyazide, routine home monitoring for goal of less than 140/80 along with DASH diet.

## 2022-06-28 LAB
ALBUMIN SERPL-MCNC: 4.5 G/DL (ref 3.8–4.8)
ALBUMIN/GLOB SERPL: 1.5 {RATIO} (ref 1.2–2.2)
ALP SERPL-CCNC: 78 IU/L (ref 44–121)
ALT SERPL-CCNC: 17 IU/L (ref 0–32)
AST SERPL-CCNC: 18 IU/L (ref 0–40)
BILIRUB SERPL-MCNC: 0.6 MG/DL (ref 0–1.2)
BUN SERPL-MCNC: 16 MG/DL (ref 8–27)
BUN/CREAT SERPL: 20 (ref 12–28)
CALCIUM SERPL-MCNC: 9.4 MG/DL (ref 8.7–10.3)
CHLORIDE SERPL-SCNC: 104 MMOL/L (ref 96–106)
CHOLEST SERPL-MCNC: 168 MG/DL (ref 100–199)
CO2 SERPL-SCNC: 22 MMOL/L (ref 20–29)
CREAT SERPL-MCNC: 0.8 MG/DL (ref 0.57–1)
EGFRCR SERPLBLD CKD-EPI 2021: 82 ML/MIN/1.73
ERYTHROCYTE [DISTWIDTH] IN BLOOD BY AUTOMATED COUNT: 12.7 % (ref 11.7–15.4)
GLOBULIN SER CALC-MCNC: 3 G/DL (ref 1.5–4.5)
GLUCOSE SERPL-MCNC: 114 MG/DL (ref 65–99)
HBA1C MFR BLD: 6.5 % (ref 4.8–5.6)
HCT VFR BLD AUTO: 39.5 % (ref 34–46.6)
HDLC SERPL-MCNC: 59 MG/DL
HGB BLD-MCNC: 12.9 G/DL (ref 11.1–15.9)
LDLC SERPL CALC-MCNC: 80 MG/DL (ref 0–99)
MCH RBC QN AUTO: 28.8 PG (ref 26.6–33)
MCHC RBC AUTO-ENTMCNC: 32.7 G/DL (ref 31.5–35.7)
MCV RBC AUTO: 88 FL (ref 79–97)
PLATELET # BLD AUTO: 286 X10E3/UL (ref 150–450)
POTASSIUM SERPL-SCNC: 4.5 MMOL/L (ref 3.5–5.2)
PROT SERPL-MCNC: 7.5 G/DL (ref 6–8.5)
RBC # BLD AUTO: 4.48 X10E6/UL (ref 3.77–5.28)
SODIUM SERPL-SCNC: 142 MMOL/L (ref 134–144)
TRIGL SERPL-MCNC: 174 MG/DL (ref 0–149)
TSH SERPL DL<=0.005 MIU/L-ACNC: 1.97 UIU/ML (ref 0.45–4.5)
VLDLC SERPL CALC-MCNC: 29 MG/DL (ref 5–40)
WBC # BLD AUTO: 7.1 X10E3/UL (ref 3.4–10.8)

## 2022-06-29 NOTE — PROGRESS NOTES
Good afternoon Ms. Ocasio, labs are back.  Blood count is normal with no anemia, normal white blood cells and platelets.  Kidney function, liver enzymes and electrolytes are stable.  Cholesterol panel looks good overall, mild elevation in triglycerides, watch intake of fats in the diet.  Thyroid numbers are stable.  A1c is the same as last check at 6.5.  We want to make sure the A1c stays less than 7.0, please continue to watch intake of concentrated sweets and carbs in the diet and we will recheck routinely.  Let me know if you have any questions and have a great day,    CRISTIAN Trejo

## 2022-07-06 ENCOUNTER — TRANSCRIBE ORDERS (OUTPATIENT)
Dept: ADMINISTRATIVE | Facility: HOSPITAL | Age: 67
End: 2022-07-06

## 2022-07-06 DIAGNOSIS — Z12.31 ENCOUNTER FOR SCREENING MAMMOGRAM FOR MALIGNANT NEOPLASM OF BREAST: Primary | ICD-10-CM

## 2022-07-07 DIAGNOSIS — I10 ESSENTIAL HYPERTENSION: ICD-10-CM

## 2022-07-07 RX ORDER — TRIAMTERENE AND HYDROCHLOROTHIAZIDE 37.5; 25 MG/1; MG/1
1 CAPSULE ORAL DAILY
Qty: 90 CAPSULE | Refills: 2 | Status: SHIPPED | OUTPATIENT
Start: 2022-07-07

## 2022-09-22 ENCOUNTER — HOSPITAL ENCOUNTER (OUTPATIENT)
Dept: MAMMOGRAPHY | Facility: HOSPITAL | Age: 67
Discharge: HOME | End: 2022-09-22
Admitting: NURSE PRACTITIONER

## 2022-09-22 DIAGNOSIS — Z12.31 ENCOUNTER FOR SCREENING MAMMOGRAM FOR MALIGNANT NEOPLASM OF BREAST: ICD-10-CM

## 2022-09-22 PROCEDURE — 77063 BREAST TOMOSYNTHESIS BI: CPT

## 2022-09-22 PROCEDURE — 77067 SCR MAMMO BI INCL CAD: CPT

## 2022-09-22 NOTE — PROGRESS NOTES
Good afternoon Ms. Ocasio, your mammogram is back and looks stable, no evidence of malignancy or significant change in either breast.  I recommend a routine follow-up mammogram in 1 year.

## 2022-10-02 DIAGNOSIS — I10 ESSENTIAL HYPERTENSION: ICD-10-CM

## 2022-10-03 RX ORDER — ESCITALOPRAM OXALATE 10 MG/1
TABLET ORAL
Qty: 90 TABLET | Refills: 0 | Status: SHIPPED | OUTPATIENT
Start: 2022-10-03 | End: 2023-01-03

## 2022-10-23 DIAGNOSIS — I10 PRIMARY HYPERTENSION: Chronic | ICD-10-CM

## 2022-10-24 RX ORDER — ATENOLOL 25 MG/1
TABLET ORAL
Qty: 90 TABLET | Refills: 0 | Status: SHIPPED | OUTPATIENT
Start: 2022-10-24 | End: 2023-01-25 | Stop reason: SDUPTHER

## 2022-11-28 ENCOUNTER — TELEPHONE (OUTPATIENT)
Dept: INTERNAL MEDICINE | Facility: CLINIC | Age: 67
End: 2022-11-28

## 2022-12-04 DIAGNOSIS — E78.2 MIXED HYPERLIPIDEMIA: Chronic | ICD-10-CM

## 2022-12-05 RX ORDER — ATORVASTATIN CALCIUM 10 MG/1
10 TABLET, FILM COATED ORAL EVERY EVENING
Qty: 90 TABLET | Refills: 0 | Status: SHIPPED | OUTPATIENT
Start: 2022-12-05 | End: 2023-03-09 | Stop reason: SDUPTHER

## 2022-12-28 ENCOUNTER — OFFICE VISIT (OUTPATIENT)
Dept: INTERNAL MEDICINE | Facility: CLINIC | Age: 67
End: 2022-12-28

## 2022-12-28 VITALS
BODY MASS INDEX: 34.21 KG/M2 | HEIGHT: 67 IN | TEMPERATURE: 97 F | SYSTOLIC BLOOD PRESSURE: 124 MMHG | OXYGEN SATURATION: 95 % | DIASTOLIC BLOOD PRESSURE: 80 MMHG | WEIGHT: 218 LBS | HEART RATE: 78 BPM

## 2022-12-28 DIAGNOSIS — F41.8 DEPRESSION WITH ANXIETY: Chronic | ICD-10-CM

## 2022-12-28 DIAGNOSIS — Z00.00 WELCOME TO MEDICARE PREVENTIVE VISIT: Primary | ICD-10-CM

## 2022-12-28 DIAGNOSIS — Z78.0 POST-MENOPAUSAL: ICD-10-CM

## 2022-12-28 DIAGNOSIS — E78.2 MIXED HYPERLIPIDEMIA: Chronic | ICD-10-CM

## 2022-12-28 DIAGNOSIS — H60.543 ECZEMA OF EXTERNAL EAR, BILATERAL: ICD-10-CM

## 2022-12-28 DIAGNOSIS — Z23 ENCOUNTER FOR IMMUNIZATION: ICD-10-CM

## 2022-12-28 DIAGNOSIS — R73.03 PREDIABETES: Chronic | ICD-10-CM

## 2022-12-28 DIAGNOSIS — I10 PRIMARY HYPERTENSION: Chronic | ICD-10-CM

## 2022-12-28 LAB
ALBUMIN SERPL-MCNC: 4.5 G/DL (ref 3.5–5.2)
ALBUMIN/GLOB SERPL: 1.5 G/DL
ALP SERPL-CCNC: 82 U/L (ref 39–117)
ALT SERPL-CCNC: 13 U/L (ref 1–33)
AST SERPL-CCNC: 13 U/L (ref 1–32)
BASOPHILS # BLD AUTO: 0.08 10*3/MM3 (ref 0–0.2)
BASOPHILS NFR BLD AUTO: 0.9 % (ref 0–1.5)
BILIRUB SERPL-MCNC: 0.5 MG/DL (ref 0–1.2)
BUN SERPL-MCNC: 16 MG/DL (ref 8–23)
BUN/CREAT SERPL: 19.8 (ref 7–25)
CALCIUM SERPL-MCNC: 9.9 MG/DL (ref 8.6–10.5)
CHLORIDE SERPL-SCNC: 102 MMOL/L (ref 98–107)
CHOLEST SERPL-MCNC: 170 MG/DL (ref 0–200)
CO2 SERPL-SCNC: 27.7 MMOL/L (ref 22–29)
CREAT SERPL-MCNC: 0.81 MG/DL (ref 0.57–1)
EGFRCR SERPLBLD CKD-EPI 2021: 80.7 ML/MIN/1.73
EOSINOPHIL # BLD AUTO: 0.65 10*3/MM3 (ref 0–0.4)
EOSINOPHIL NFR BLD AUTO: 7.4 % (ref 0.3–6.2)
ERYTHROCYTE [DISTWIDTH] IN BLOOD BY AUTOMATED COUNT: 12 % (ref 12.3–15.4)
GLOBULIN SER CALC-MCNC: 3 GM/DL
GLUCOSE SERPL-MCNC: 124 MG/DL (ref 65–99)
HBA1C MFR BLD: 6.8 % (ref 4.8–5.6)
HCT VFR BLD AUTO: 38.8 % (ref 34–46.6)
HDLC SERPL-MCNC: 65 MG/DL (ref 40–60)
HGB BLD-MCNC: 13 G/DL (ref 12–15.9)
IMM GRANULOCYTES # BLD AUTO: 0.03 10*3/MM3 (ref 0–0.05)
IMM GRANULOCYTES NFR BLD AUTO: 0.3 % (ref 0–0.5)
LDLC SERPL CALC-MCNC: 80 MG/DL (ref 0–100)
LYMPHOCYTES # BLD AUTO: 1.86 10*3/MM3 (ref 0.7–3.1)
LYMPHOCYTES NFR BLD AUTO: 21.1 % (ref 19.6–45.3)
MCH RBC QN AUTO: 28 PG (ref 26.6–33)
MCHC RBC AUTO-ENTMCNC: 33.5 G/DL (ref 31.5–35.7)
MCV RBC AUTO: 83.4 FL (ref 79–97)
MONOCYTES # BLD AUTO: 0.67 10*3/MM3 (ref 0.1–0.9)
MONOCYTES NFR BLD AUTO: 7.6 % (ref 5–12)
NEUTROPHILS # BLD AUTO: 5.51 10*3/MM3 (ref 1.7–7)
NEUTROPHILS NFR BLD AUTO: 62.7 % (ref 42.7–76)
NRBC BLD AUTO-RTO: 0 /100 WBC (ref 0–0.2)
PLATELET # BLD AUTO: 293 10*3/MM3 (ref 140–450)
POTASSIUM SERPL-SCNC: 4.3 MMOL/L (ref 3.5–5.2)
PROT SERPL-MCNC: 7.5 G/DL (ref 6–8.5)
RBC # BLD AUTO: 4.65 10*6/MM3 (ref 3.77–5.28)
SODIUM SERPL-SCNC: 140 MMOL/L (ref 136–145)
TRIGL SERPL-MCNC: 144 MG/DL (ref 0–150)
VLDLC SERPL CALC-MCNC: 25 MG/DL (ref 5–40)
WBC # BLD AUTO: 8.8 10*3/MM3 (ref 3.4–10.8)

## 2022-12-28 PROCEDURE — G0402 INITIAL PREVENTIVE EXAM: HCPCS | Performed by: NURSE PRACTITIONER

## 2022-12-28 PROCEDURE — 1159F MED LIST DOCD IN RCRD: CPT | Performed by: NURSE PRACTITIONER

## 2022-12-28 PROCEDURE — 1126F AMNT PAIN NOTED NONE PRSNT: CPT | Performed by: NURSE PRACTITIONER

## 2022-12-28 PROCEDURE — 99213 OFFICE O/P EST LOW 20 MIN: CPT | Performed by: NURSE PRACTITIONER

## 2022-12-28 PROCEDURE — 1170F FXNL STATUS ASSESSED: CPT | Performed by: NURSE PRACTITIONER

## 2022-12-28 PROCEDURE — 90677 PCV20 VACCINE IM: CPT | Performed by: NURSE PRACTITIONER

## 2022-12-28 PROCEDURE — G0009 ADMIN PNEUMOCOCCAL VACCINE: HCPCS | Performed by: NURSE PRACTITIONER

## 2022-12-28 RX ORDER — BETAMETHASONE DIPROPIONATE 0.5 MG/G
CREAM TOPICAL DAILY PRN
Qty: 15 G | Refills: 1 | Status: SHIPPED | OUTPATIENT
Start: 2022-12-28

## 2022-12-28 NOTE — PROGRESS NOTES
The ABCs of the Annual Wellness Visit  [place] to Medicare Visit    Subjective     Karishma Ocasio is a 67 y.o. female who presents for a  Welcome to Medicare Visit.    The following portions of the patient's history were reviewed and   updated as appropriate: allergies, current medications, past family history, past medical history, past social history, past surgical history and problem list.     Compared to one year ago, the patient feels her physical   health is the same.    Compared to one year ago, the patient feels her mental   health is the same.    Recent Hospitalizations:  She was not admitted to the hospital during the last year.       Current Medical Providers:  Patient Care Team:  Jacey Lockhart APRN as PCP - General (Internal Medicine)  Shawna Dominguez MD as Consulting Physician (Ophthalmology)    Outpatient Medications Prior to Visit   Medication Sig Dispense Refill   • atenolol (TENORMIN) 25 MG tablet TAKE 1 TABLET BY MOUTH EVERY DAY 90 tablet 0   • atorvastatin (LIPITOR) 10 MG tablet TAKE 1 TABLET BY MOUTH EVERY EVENING 90 tablet 0   • escitalopram (LEXAPRO) 10 MG tablet TAKE 1 TABLET BY MOUTH EVERY DAY 90 tablet 0   • LEVOCETIRIZINE DIHYDROCHLORIDE PO      • neomycin-polymyxin-hydrocortisone (CORTISPORIN) 1 % solution otic solution Administer 3 drops into both ears 4 (Four) Times a Day. 10 mL 1   • triamterene-hydrochlorothiazide (DYAZIDE) 37.5-25 MG per capsule Take 1 capsule by mouth Daily. 90 capsule 2   • betamethasone dipropionate 0.05 % cream Apply  topically to the appropriate area as directed Daily As Needed for Irritation or Rash. 15 g 1     No facility-administered medications prior to visit.       No opioid medication identified on active medication list. I have reviewed chart for other potential  high risk medication/s and harmful drug interactions in the elderly.          Aspirin is not on active medication list.  Aspirin use is not indicated based on review of current medical  "condition/s. Risk of harm outweighs potential benefits.  .    Patient Active Problem List   Diagnosis   • Hypertension   • PE (pulmonary embolism)   • Encounter for screening colonoscopy   • Prediabetes   • Mixed hyperlipidemia   • Depression with anxiety   • Healthcare maintenance   • Eczema of external ear, bilateral     Advance Care Planning  Advance Directive is on file.  ACP discussion was held with the patient during this visit. Patient has an advance directive in EMR which is still valid.        Objective   Vitals:    22 0848   BP: 124/80   BP Location: Left arm   Patient Position: Sitting   Cuff Size: Large Adult   Pulse: 78   Temp: 97 °F (36.1 °C)   TempSrc: Infrared   SpO2: 95%   Weight: 98.9 kg (218 lb)   Height: 170.2 cm (67\")   PainSc: 0-No pain     Estimated body mass index is 34.14 kg/m² as calculated from the following:    Height as of this encounter: 170.2 cm (67\").    Weight as of this encounter: 98.9 kg (218 lb).    BMI is >= 30 and <35. (Class 1 Obesity). The following options were offered after discussion;: exercise counseling/recommendations and nutrition counseling/recommendations      Does the patient have evidence of cognitive impairment?   No         Procedures       HEALTH RISK ASSESSMENT    Smoking Status:  Social History     Tobacco Use   Smoking Status Former   • Packs/day: 0.00   • Years: 0.00   • Pack years: 0.00   • Types: Cigarettes   • Quit date: 2000   • Years since quittin.0   Smokeless Tobacco Never     Alcohol Consumption:  Social History     Substance and Sexual Activity   Alcohol Use No       Fall Risk Screen:    STEADI Fall Risk Assessment was completed, and patient is at LOW risk for falls.Assessment completed on:2022    Depression Screen:   PHQ-2/PHQ-9 Depression Screening 2022   Little Interest or Pleasure in Doing Things 0-->not at all   Feeling Down, Depressed or Hopeless 0-->not at all   PHQ-9: Brief Depression Severity Measure Score 0 "       Health Habits and Functional and Cognitive Screening:  No flowsheet data found.    Visual Acuity:    No results found.    Age-appropriate Screening Schedule:  Refer to the list below for future screening recommendations based on patient's age, sex and/or medical conditions. Orders for these recommended tests are listed in the plan section. The patient has been provided with a written plan.    Health Maintenance   Topic Date Due   • TDAP/TD VACCINES (1 - Tdap) 12/28/2022 (Originally 7/13/1974)   • PAP SMEAR  06/04/2023   • LIPID PANEL  06/27/2023   • MAMMOGRAM  09/22/2023   • DXA SCAN  10/26/2023   • INFLUENZA VACCINE  Completed   • ZOSTER VACCINE  Completed        CMS Preventative Services Quick Reference  Risk Factors Identified During Encounter    Immunizations Discussed/Encouraged: Tdap, Prevnar 20 (Pneumococcal 20-valent conjugate) and COVID19  Dental Screening Recommended  Vision Screening Recommended  The above risks/problems have been discussed with the patient.  Pertinent information has been shared with the patient in the After Visit Summary.    Follow Up:   Initial Medicare Visit in one year    An After Visit Summary and PPPS were made available to the patient.      Additional E&M Note during same encounter follows:  Patient has multiple medical problems which are significant and separately identifiable that require additional work above and beyond the Medicare Wellness Visit.      Chief Complaint  Establish Care    Subjective        HPI  Karishma Ocasio is also being seen today for establishment of care/chronic care management.     Mammogram UTD. Dexa scan is due in April 2023.     Patient is currently . Patient is also retired.     Patient is planning on working on weight management with diet and exercise.     Hx PE back in 2000.     HLD-- continues on lipitor. Denies any issues.     HTN-- continues on atenolol and dyazide for HTN control. Denies any CP. Reports checking BP regularly at  "home-- averaging 120's/60-80's.     Continues on lexapro-- denies any issues with mood. Reports doing well. Denies any suicidal ideation.      Patient reports some flaking/itching to external ear-- hx of eczema to ear; using betamethasone cream PRN.         Objective   Vital Signs:  /80 (BP Location: Left arm, Patient Position: Sitting, Cuff Size: Large Adult)   Pulse 78   Temp 97 °F (36.1 °C) (Infrared)   Ht 170.2 cm (67\")   Wt 98.9 kg (218 lb)   SpO2 95%   BMI 34.14 kg/m²     Physical Exam  Constitutional:       Appearance: Normal appearance.   HENT:      Head: Normocephalic and atraumatic.      Right Ear: Tympanic membrane and ear canal normal.      Left Ear: Tympanic membrane and ear canal normal.      Nose: Nose normal.      Mouth/Throat:      Mouth: Mucous membranes are moist.      Pharynx: Oropharynx is clear.   Eyes:      Conjunctiva/sclera: Conjunctivae normal.      Pupils: Pupils are equal, round, and reactive to light.   Neck:      Vascular: No carotid bruit.   Cardiovascular:      Rate and Rhythm: Normal rate and regular rhythm.      Pulses: Normal pulses.      Heart sounds: Normal heart sounds. No murmur heard.    No friction rub. No gallop.   Pulmonary:      Effort: Pulmonary effort is normal. No respiratory distress.      Breath sounds: Normal breath sounds. No stridor. No wheezing, rhonchi or rales.   Abdominal:      General: Bowel sounds are normal. There is no distension.      Palpations: Abdomen is soft.      Tenderness: There is no abdominal tenderness. There is no guarding.   Musculoskeletal:      Cervical back: Normal range of motion and neck supple.   Skin:     General: Skin is warm and dry.      Comments: Mild erythema with some flaking of skin to external b/l ears   Neurological:      General: No focal deficit present.      Mental Status: She is alert and oriented to person, place, and time. Mental status is at baseline.   Psychiatric:         Mood and Affect: Mood normal.        "  Thought Content: Thought content normal.         Judgment: Judgment normal.          The following data was reviewed by: CRISTIAN Claros on 12/28/2022:  Common labs    Common Labs 6/27/22 6/27/22 6/27/22 6/27/22    0937 0937 0937 0937   Glucose  114 (A)     BUN  16     Creatinine  0.80     Sodium  142     Potassium  4.5     Chloride  104     Calcium  9.4     Total Protein  7.5     Albumin  4.5     Total Bilirubin  0.6     Alkaline Phosphatase  78     AST (SGOT)  18     ALT (SGPT)  17     WBC 7.1      Hemoglobin 12.9      Hematocrit 39.5      Platelets 286      Total Cholesterol   168    Triglycerides   174 (A)    HDL Cholesterol   59    LDL Cholesterol    80    Hemoglobin A1C    6.5 (A)   (A) Abnormal value       Comments are available for some flowsheets but are not being displayed.                   Assessment and Plan   Diagnoses and all orders for this visit:    1. Welcome to Medicare preventive visit (Primary)    2. Primary hypertension  Assessment & Plan:  Hypertension is improving with treatment.  Continue current treatment regimen.  Dietary sodium restriction.  Weight loss.  Regular aerobic exercise.  Continue current medications.  Blood pressure will be reassessed at the next regular appointment.    Orders:  -     CBC & Differential  -     Comprehensive metabolic panel    3. Mixed hyperlipidemia  Assessment & Plan:  Lipid abnormalities are unchanged.  Nutritional counseling was provided. and Pharmacotherapy as ordered.  Lipids will be reassessed in 6 months.    Orders:  -     Lipid panel    4. Prediabetes  -     Hemoglobin A1c    5. Depression with anxiety  Assessment & Plan:  Patient's depression is recurrent and is mild without psychosis. Their depression is currently in full remission and the condition is improving with treatment. This will be reassessed at the next regular appointment. F/U as described:patient will continue current medication therapy.      6. Post-menopausal  -     DEXA Bone  Density Axial; Future    7. Eczema of external ear, bilateral  Assessment & Plan:  Advised to use luke warm water in shower.   Aquaphor PRN to area.   Use betamethasone ointment PRN-- average 7-10 days.   Use wash cloth to cleanse PRN.     Orders:  -     betamethasone dipropionate 0.05 % cream; Apply  topically to the appropriate area as directed Daily As Needed for Irritation or Rash.  Dispense: 15 g; Refill: 1    8. Encounter for immunization  -     Pneumococcal Conjugate Vaccine 20-Valent (PCV20)           Follow Up   Return in about 6 months (around 6/28/2023) for Recheck chronic conditions.  Patient was given instructions and counseling regarding her condition or for health maintenance advice. Please see specific information pulled into the AVS if appropriate.

## 2022-12-28 NOTE — ASSESSMENT & PLAN NOTE
Advised to use luke warm water in shower.   Aquaphor PRN to area.   Use betamethasone ointment PRN-- average 7-10 days.   Use wash cloth to cleanse PRN.

## 2022-12-29 DIAGNOSIS — E11.9 TYPE 2 DIABETES MELLITUS WITHOUT COMPLICATION, WITHOUT LONG-TERM CURRENT USE OF INSULIN: Primary | ICD-10-CM

## 2022-12-29 RX ORDER — METFORMIN HYDROCHLORIDE 500 MG/1
500 TABLET, EXTENDED RELEASE ORAL
Qty: 90 TABLET | Refills: 0 | Status: SHIPPED | OUTPATIENT
Start: 2022-12-29 | End: 2023-03-29

## 2022-12-29 NOTE — PROGRESS NOTES
Please let patient know--  A1C came back at 6.8-- this would indicate full diabetes. Recommend starting low dose metformin dialy with breakfast-- will send to pharmacy.   Patient should be sure to get diabetic eye exam 1x yearly and to continue to monitor foot health.   I would like patient to return to see me for visit in 3 months.   LDL stable; cont on low sat fat/low refined carb diet and exercise.   CBC stable  CMP shows elevated glucose; but kidney function is stable.     Metformin sent to pharmacy. If patient interested in meeting with nutritionist or going to diabetes classes, please let me know. F/u with me in 3 months

## 2022-12-30 ENCOUNTER — PATIENT MESSAGE (OUTPATIENT)
Dept: INTERNAL MEDICINE | Facility: CLINIC | Age: 67
End: 2022-12-30

## 2022-12-30 NOTE — TELEPHONE ENCOUNTER
From: Karishma Ocasio  To: Jacey Lockhart  Sent: 12/30/2022 1:39 PM EST  Subject: A1C    I have read your recommendations in my summary notes and I would rather not start taking med at this time. I have been eating very poorly and gained 25 lbs back I lost. I am going to do better. I am already seeing eye doctor yearly. I will change my appointment to 3 months and see what’s going on then.     Thanks, Veronica

## 2023-01-02 DIAGNOSIS — I10 ESSENTIAL HYPERTENSION: ICD-10-CM

## 2023-01-03 RX ORDER — ESCITALOPRAM OXALATE 10 MG/1
TABLET ORAL
Qty: 90 TABLET | Refills: 0 | Status: SHIPPED | OUTPATIENT
Start: 2023-01-03 | End: 2023-04-03

## 2023-01-25 DIAGNOSIS — I10 PRIMARY HYPERTENSION: Chronic | ICD-10-CM

## 2023-01-25 RX ORDER — ATENOLOL 25 MG/1
25 TABLET ORAL DAILY
Qty: 90 TABLET | Refills: 0 | Status: SHIPPED | OUTPATIENT
Start: 2023-01-25

## 2023-01-26 ENCOUNTER — TELEPHONE (OUTPATIENT)
Dept: INTERNAL MEDICINE | Facility: CLINIC | Age: 68
End: 2023-01-26

## 2023-01-26 NOTE — TELEPHONE ENCOUNTER
Caller: Karishma Ocasio    Relationship: Self    Best call back number: [phone]    What is the best time to reach you: ANY    Who are you requesting to speak with (clinical staff, provider,  specific staff member): JES    Do you know the name of the person who called: KARISHMA    What was the call regarding: PHARMACY STATES THAT THEY STILL HAVE NOT RECEIVED THE PRESCRIPTION FOR atenolol (TENORMIN) 25 MG tablet    Do you require a callback: HUB ATTEMPTED TO WARM TRANSFER BUT WAS UNSUCCESSFUL. PLEASE CALL PATIENT BACK.

## 2023-03-09 ENCOUNTER — PATIENT MESSAGE (OUTPATIENT)
Dept: INTERNAL MEDICINE | Facility: CLINIC | Age: 68
End: 2023-03-09
Payer: COMMERCIAL

## 2023-03-09 DIAGNOSIS — E78.2 MIXED HYPERLIPIDEMIA: Chronic | ICD-10-CM

## 2023-03-09 RX ORDER — ATORVASTATIN CALCIUM 10 MG/1
10 TABLET, FILM COATED ORAL EVERY EVENING
Qty: 90 TABLET | Refills: 0 | Status: SHIPPED | OUTPATIENT
Start: 2023-03-09

## 2023-03-09 NOTE — TELEPHONE ENCOUNTER
From: Karishma Ocasio  To: Jacey Lockhart  Sent: 3/9/2023 10:59 AM EST  Subject: Atorvastatin refill    One week ago I requested from [facility] a refill and they were to contact you for one. Never heard anything until today from [facility] saying it was denied. I’m currently in [place] and ran out Tuesday . Can I ask why it was denied?

## 2023-03-29 ENCOUNTER — OFFICE VISIT (OUTPATIENT)
Dept: INTERNAL MEDICINE | Facility: CLINIC | Age: 68
End: 2023-03-29
Payer: MEDICARE

## 2023-03-29 VITALS
HEART RATE: 74 BPM | HEIGHT: 67 IN | BODY MASS INDEX: 32.8 KG/M2 | OXYGEN SATURATION: 92 % | DIASTOLIC BLOOD PRESSURE: 71 MMHG | SYSTOLIC BLOOD PRESSURE: 107 MMHG | WEIGHT: 209 LBS

## 2023-03-29 DIAGNOSIS — E11.9 TYPE 2 DIABETES MELLITUS WITHOUT COMPLICATION, WITHOUT LONG-TERM CURRENT USE OF INSULIN: ICD-10-CM

## 2023-03-29 DIAGNOSIS — E66.09 CLASS 1 OBESITY DUE TO EXCESS CALORIES WITH SERIOUS COMORBIDITY AND BODY MASS INDEX (BMI) OF 32.0 TO 32.9 IN ADULT: ICD-10-CM

## 2023-03-29 DIAGNOSIS — I10 PRIMARY HYPERTENSION: Primary | Chronic | ICD-10-CM

## 2023-03-29 DIAGNOSIS — H53.9 VISION ABNORMALITIES: ICD-10-CM

## 2023-03-29 DIAGNOSIS — E78.2 MIXED HYPERLIPIDEMIA: Chronic | ICD-10-CM

## 2023-03-29 PROBLEM — E66.811 CLASS 1 OBESITY DUE TO EXCESS CALORIES WITH SERIOUS COMORBIDITY AND BODY MASS INDEX (BMI) OF 32.0 TO 32.9 IN ADULT: Status: ACTIVE | Noted: 2023-03-29

## 2023-03-29 NOTE — PROGRESS NOTES
"Chief Complaint  Establish Care, Follow-up, Results, and Labs Only (Blood work form previous visit )    Subjective        Karishma Ocasio presents to [facility]  History of Present Illness  This is a 66 y/o female presenting to office for f/u with chronic health conditions.     DM2-- did not want to start metformin. Patient reports she does see an eye doctor once yearly. Patient reports she has been modifying diet and exercising regularly. Denies any issues with feet.     HTN-- continues on atenolol and dyazide. Reports checking BP regularly-- wnl. Denies CP.     HLD-- continues on atorvastatin.     Reports when she was driving home from [place] earlier this month she was having some issues with peripheral vision. Reports seeing \"squiggly lines.\" Reports this has resolved and has not reoccurred.       Objective   Vital Signs:  /71 (BP Location: Left arm, Patient Position: Sitting, Cuff Size: Adult)   Pulse 74   Ht 170.2 cm (67\")   Wt 94.8 kg (209 lb)   SpO2 92%   BMI 32.73 kg/m²   Estimated body mass index is 32.73 kg/m² as calculated from the following:    Height as of this encounter: 170.2 cm (67\").    Weight as of this encounter: 94.8 kg (209 lb).       BMI is >= 30 and <35. (Class 1 Obesity). The following options were offered after discussion;: exercise counseling/recommendations and nutrition counseling/recommendations      Physical Exam  Constitutional:       Appearance: Normal appearance. She is obese.   HENT:      Head: Normocephalic and atraumatic.      Right Ear: External ear normal.      Left Ear: External ear normal.   Eyes:      General: Lids are normal. Vision grossly intact. Gaze aligned appropriately.         Right eye: No foreign body, discharge or hordeolum.      Extraocular Movements:      Right eye: Normal extraocular motion and no nystagmus.      Conjunctiva/sclera:      Right eye: Right conjunctiva is not injected.   Cardiovascular:      Rate and " Rhythm: Normal rate and regular rhythm.      Pulses: Normal pulses.      Heart sounds: Normal heart sounds. No murmur heard.    No gallop.   Pulmonary:      Effort: Pulmonary effort is normal. No respiratory distress.      Breath sounds: Normal breath sounds. No wheezing or rales.   Musculoskeletal:         General: No swelling. Normal range of motion.      Cervical back: Normal range of motion and neck supple.   Skin:     General: Skin is warm and dry.   Neurological:      General: No focal deficit present.      Mental Status: She is alert and oriented to person, place, and time. Mental status is at baseline.      Motor: No weakness.   Psychiatric:         Mood and Affect: Mood normal.         Thought Content: Thought content normal.         Judgment: Judgment normal.        Result Review :  The following data was reviewed by: CRISTIAN Claros on 03/29/2023:  Common labs    Common Labs 6/27/22 6/27/22 6/27/22 6/27/22 12/28/22 12/28/22 12/28/22 12/28/22    0937 0937 0937 0937 0941 0941 0941 0941   Glucose  114 (A)      124 (A)   BUN  16      16   Creatinine  0.80      0.81   Sodium  142      140   Potassium  4.5      4.3   Chloride  104      102   Calcium  9.4      9.9   Total Protein  7.5      7.5   Albumin  4.5      4.5   Total Bilirubin  0.6      0.5   Alkaline Phosphatase  78      82   AST (SGOT)  18      13   ALT (SGPT)  17      13   WBC 7.1      8.80    Hemoglobin 12.9      13.0    Hematocrit 39.5      38.8    Platelets 286      293    Total Cholesterol   168   170     Triglycerides   174 (A)   144     HDL Cholesterol   59   65 (A)     LDL Cholesterol    80   80     Hemoglobin A1C    6.5 (A) 6.80 (A)      (A) Abnormal value       Comments are available for some flowsheets but are not being displayed.                        Assessment and Plan   Diagnoses and all orders for this visit:    1. Primary hypertension (Primary)  Assessment & Plan:  Hypertension is improving with treatment.  Continue current  treatment regimen.  Blood pressure will be reassessed at the next regular appointment.    Orders:  -     Comprehensive metabolic panel    2. Type 2 diabetes mellitus without complication, without long-term current use of insulin (HCC)  Assessment & Plan:  Diabetes is unchanged.   Dietary recommendations for ADA diet.  Regular aerobic exercise.  Discussed ways to avoid symptomatic hypoglycemia.  Discussed sick day management.  Discussed foot care.  Reminded to get yearly retinal exam.  Diabetes will be reassessed in 3 months.    Orders:  -     MicroAlbumin, Urine, Random - Urine, Clean Catch  -     Hemoglobin A1c    3. Mixed hyperlipidemia  Assessment & Plan:  Lipid abnormalities are improving with treatment.  Nutritional counseling was provided. and Pharmacotherapy as ordered.  Lipids will be reassessed in 3 months.    Orders:  -     Lipid panel    4. Vision abnormalities  Assessment & Plan:  Recommended to f/u with optho-- advised to inform optho of diabetes dx.              Follow Up   Return in about 3 months (around 6/29/2023) for Recheck chronic health conditions.  Patient was given instructions and counseling regarding her condition or for health maintenance advice. Please see specific information pulled into the AVS if appropriate.

## 2023-03-29 NOTE — ASSESSMENT & PLAN NOTE
Patient's (Body mass index is 32.73 kg/m².) indicates that they are obese (BMI >30) with health conditions that include hypertension, diabetes mellitus and dyslipidemias . Weight is unchanged. BMI  is above average; BMI management plan is completed. We discussed low calorie, low carb based diet program, portion control, increasing exercise and joining a fitness center or start home based exercise program.

## 2023-03-29 NOTE — ASSESSMENT & PLAN NOTE
Diabetes is unchanged.   Dietary recommendations for ADA diet.  Regular aerobic exercise.  Discussed ways to avoid symptomatic hypoglycemia.  Discussed sick day management.  Discussed foot care.  Reminded to get yearly retinal exam.  Diabetes will be reassessed in 3 months.

## 2023-03-30 LAB
ALBUMIN SERPL-MCNC: 4.5 G/DL (ref 3.5–5.2)
ALBUMIN/GLOB SERPL: 1.6 G/DL
ALP SERPL-CCNC: 75 U/L (ref 39–117)
ALT SERPL-CCNC: 12 U/L (ref 1–33)
AST SERPL-CCNC: 15 U/L (ref 1–32)
BILIRUB SERPL-MCNC: 0.7 MG/DL (ref 0–1.2)
BUN SERPL-MCNC: 13 MG/DL (ref 8–23)
BUN/CREAT SERPL: 17.6 (ref 7–25)
CALCIUM SERPL-MCNC: 10.1 MG/DL (ref 8.6–10.5)
CHLORIDE SERPL-SCNC: 102 MMOL/L (ref 98–107)
CHOLEST SERPL-MCNC: 147 MG/DL (ref 0–200)
CO2 SERPL-SCNC: 26 MMOL/L (ref 22–29)
CREAT SERPL-MCNC: 0.74 MG/DL (ref 0.57–1)
EGFRCR SERPLBLD CKD-EPI 2021: 89.9 ML/MIN/1.73
GLOBULIN SER CALC-MCNC: 2.9 GM/DL
GLUCOSE SERPL-MCNC: 110 MG/DL (ref 65–99)
HBA1C MFR BLD: 6.7 % (ref 4.8–5.6)
HDLC SERPL-MCNC: 57 MG/DL (ref 40–60)
LDLC SERPL CALC-MCNC: 65 MG/DL (ref 0–100)
MICROALBUMIN UR-MCNC: 5.9 UG/ML
POTASSIUM SERPL-SCNC: 4.4 MMOL/L (ref 3.5–5.2)
PROT SERPL-MCNC: 7.4 G/DL (ref 6–8.5)
SODIUM SERPL-SCNC: 139 MMOL/L (ref 136–145)
TRIGL SERPL-MCNC: 149 MG/DL (ref 0–150)
VLDLC SERPL CALC-MCNC: 25 MG/DL (ref 5–40)

## 2023-04-02 DIAGNOSIS — I10 ESSENTIAL HYPERTENSION: ICD-10-CM

## 2023-04-03 RX ORDER — ESCITALOPRAM OXALATE 10 MG/1
TABLET ORAL
Qty: 90 TABLET | Refills: 0 | Status: SHIPPED | OUTPATIENT
Start: 2023-04-03

## 2023-04-04 DIAGNOSIS — I10 ESSENTIAL HYPERTENSION: ICD-10-CM

## 2023-04-05 RX ORDER — ESCITALOPRAM OXALATE 10 MG/1
10 TABLET ORAL DAILY
Qty: 90 TABLET | Refills: 0 | OUTPATIENT
Start: 2023-04-05

## 2023-04-27 DIAGNOSIS — I10 PRIMARY HYPERTENSION: Chronic | ICD-10-CM

## 2023-04-27 DIAGNOSIS — I10 ESSENTIAL HYPERTENSION: ICD-10-CM

## 2023-04-27 RX ORDER — TRIAMTERENE AND HYDROCHLOROTHIAZIDE 37.5; 25 MG/1; MG/1
1 CAPSULE ORAL DAILY
Qty: 90 CAPSULE | Refills: 2 | Status: SHIPPED | OUTPATIENT
Start: 2023-04-27

## 2023-04-27 RX ORDER — ATENOLOL 25 MG/1
25 TABLET ORAL DAILY
Qty: 90 TABLET | Refills: 0 | Status: SHIPPED | OUTPATIENT
Start: 2023-04-27

## 2023-04-27 NOTE — TELEPHONE ENCOUNTER
Caller: [id number] - [place], [place] - [address] AT [facility] - [phone] [facility] [phone] FX    Relationship: Pharmacy    Best call back number: [phone]  Requested Prescriptions:   Requested Prescriptions     Pending Prescriptions Disp Refills   • atenolol (TENORMIN) 25 MG tablet 90 tablet 0     Sig: Take 1 tablet by mouth Daily.   • triamterene-hydrochlorothiazide (DYAZIDE) 37.5-25 MG per capsule 90 capsule 2     Sig: Take 1 capsule by mouth Daily.        Pharmacy where request should be sent: [id number] - [place], [place] - [address] AT [facility] - [phone] [facility] [phone] FX     Last office visit with prescribing clinician: 3/29/2023   Last telemedicine visit with prescribing clinician: 6/26/2023   Next office visit with prescribing clinician: 6/26/2023         Does the patient have less than a 3 day supply:  [x] Yes  [] No    Would you like a call back once the refill request has been completed: [] Yes [x] No    If the office needs to give you a call back, can they leave a voicemail: [] Yes [x] No    Garrison Hopkins Rep   04/27/23 10:49 EDT

## 2023-06-09 DIAGNOSIS — E78.2 MIXED HYPERLIPIDEMIA: Chronic | ICD-10-CM

## 2023-06-09 RX ORDER — ATORVASTATIN CALCIUM 10 MG/1
10 TABLET, FILM COATED ORAL EVERY EVENING
Qty: 90 TABLET | Refills: 0 | Status: SHIPPED | OUTPATIENT
Start: 2023-06-09

## 2023-06-13 DIAGNOSIS — E78.2 MIXED HYPERLIPIDEMIA: Chronic | ICD-10-CM

## 2023-06-13 RX ORDER — ATORVASTATIN CALCIUM 10 MG/1
10 TABLET, FILM COATED ORAL EVERY EVENING
Qty: 90 TABLET | Refills: 0 | Status: SHIPPED | OUTPATIENT
Start: 2023-06-13

## 2023-06-26 PROBLEM — J30.2 SEASONAL ALLERGIES: Status: ACTIVE | Noted: 2023-06-26

## 2023-07-24 DIAGNOSIS — I10 PRIMARY HYPERTENSION: Chronic | ICD-10-CM

## 2023-07-25 RX ORDER — ATENOLOL 25 MG/1
25 TABLET ORAL DAILY
Qty: 90 TABLET | Refills: 0 | Status: SHIPPED | OUTPATIENT
Start: 2023-07-25

## 2023-09-26 ENCOUNTER — OFFICE VISIT (OUTPATIENT)
Dept: INTERNAL MEDICINE | Facility: CLINIC | Age: 68
End: 2023-09-26
Payer: MEDICARE

## 2023-09-26 VITALS
DIASTOLIC BLOOD PRESSURE: 90 MMHG | BODY MASS INDEX: 30.45 KG/M2 | HEIGHT: 67 IN | HEART RATE: 52 BPM | WEIGHT: 194 LBS | OXYGEN SATURATION: 96 % | SYSTOLIC BLOOD PRESSURE: 120 MMHG

## 2023-09-26 DIAGNOSIS — E78.2 MIXED HYPERLIPIDEMIA: Chronic | ICD-10-CM

## 2023-09-26 DIAGNOSIS — E11.9 TYPE 2 DIABETES MELLITUS WITHOUT COMPLICATION, WITHOUT LONG-TERM CURRENT USE OF INSULIN: Chronic | ICD-10-CM

## 2023-09-26 DIAGNOSIS — I10 PRIMARY HYPERTENSION: Chronic | ICD-10-CM

## 2023-09-26 DIAGNOSIS — E11.9 ENCOUNTER FOR DIABETIC FOOT EXAM: Primary | ICD-10-CM

## 2023-09-26 DIAGNOSIS — Z12.31 SCREENING MAMMOGRAM FOR BREAST CANCER: ICD-10-CM

## 2023-09-26 LAB
ALBUMIN SERPL-MCNC: 4.9 G/DL (ref 3.5–5.2)
ALBUMIN/GLOB SERPL: 1.9 G/DL
ALP SERPL-CCNC: 74 U/L (ref 39–117)
ALT SERPL-CCNC: 16 U/L (ref 1–33)
AST SERPL-CCNC: 20 U/L (ref 1–32)
BASOPHILS # BLD AUTO: 0.09 10*3/MM3 (ref 0–0.2)
BASOPHILS NFR BLD AUTO: 1.3 % (ref 0–1.5)
BILIRUB SERPL-MCNC: 0.8 MG/DL (ref 0–1.2)
BUN SERPL-MCNC: 18 MG/DL (ref 8–23)
BUN/CREAT SERPL: 24.7 (ref 7–25)
CALCIUM SERPL-MCNC: 10.1 MG/DL (ref 8.6–10.5)
CHLORIDE SERPL-SCNC: 100 MMOL/L (ref 98–107)
CHOLEST SERPL-MCNC: 154 MG/DL (ref 0–200)
CO2 SERPL-SCNC: 26.1 MMOL/L (ref 22–29)
CREAT SERPL-MCNC: 0.73 MG/DL (ref 0.57–1)
EGFRCR SERPLBLD CKD-EPI 2021: 90.8 ML/MIN/1.73
EOSINOPHIL # BLD AUTO: 0.4 10*3/MM3 (ref 0–0.4)
EOSINOPHIL NFR BLD AUTO: 5.7 % (ref 0.3–6.2)
ERYTHROCYTE [DISTWIDTH] IN BLOOD BY AUTOMATED COUNT: 12.6 % (ref 12.3–15.4)
GLOBULIN SER CALC-MCNC: 2.6 GM/DL
GLUCOSE SERPL-MCNC: 112 MG/DL (ref 65–99)
HBA1C MFR BLD: 6.3 % (ref 4.8–5.6)
HCT VFR BLD AUTO: 39.5 % (ref 34–46.6)
HDLC SERPL-MCNC: 59 MG/DL (ref 40–60)
HGB BLD-MCNC: 13.1 G/DL (ref 12–15.9)
IMM GRANULOCYTES # BLD AUTO: 0.01 10*3/MM3 (ref 0–0.05)
IMM GRANULOCYTES NFR BLD AUTO: 0.1 % (ref 0–0.5)
LDLC SERPL CALC-MCNC: 74 MG/DL (ref 0–100)
LYMPHOCYTES # BLD AUTO: 1.99 10*3/MM3 (ref 0.7–3.1)
LYMPHOCYTES NFR BLD AUTO: 28.5 % (ref 19.6–45.3)
MCH RBC QN AUTO: 28.9 PG (ref 26.6–33)
MCHC RBC AUTO-ENTMCNC: 33.2 G/DL (ref 31.5–35.7)
MCV RBC AUTO: 87 FL (ref 79–97)
MONOCYTES # BLD AUTO: 0.59 10*3/MM3 (ref 0.1–0.9)
MONOCYTES NFR BLD AUTO: 8.5 % (ref 5–12)
NEUTROPHILS # BLD AUTO: 3.9 10*3/MM3 (ref 1.7–7)
NEUTROPHILS NFR BLD AUTO: 55.9 % (ref 42.7–76)
NRBC BLD AUTO-RTO: 0 /100 WBC (ref 0–0.2)
PLATELET # BLD AUTO: 270 10*3/MM3 (ref 140–450)
POTASSIUM SERPL-SCNC: 4.3 MMOL/L (ref 3.5–5.2)
PROT SERPL-MCNC: 7.5 G/DL (ref 6–8.5)
RBC # BLD AUTO: 4.54 10*6/MM3 (ref 3.77–5.28)
SODIUM SERPL-SCNC: 141 MMOL/L (ref 136–145)
TRIGL SERPL-MCNC: 120 MG/DL (ref 0–150)
TSH SERPL DL<=0.005 MIU/L-ACNC: 1.69 UIU/ML (ref 0.27–4.2)
VLDLC SERPL CALC-MCNC: 21 MG/DL (ref 5–40)
WBC # BLD AUTO: 6.98 10*3/MM3 (ref 3.4–10.8)

## 2023-09-26 NOTE — PROGRESS NOTES
"Chief Complaint  Hyperlipidemia and Hypertension    Subjective        Karishma Ocasio presents to [facility]    This is a 69 y/o female presenting to office for f/u with chronic health conditions.     DM2-- continues with diet control. UTD with eye exam. Denies any foot wounds or open areas.     HTN-- continues on atenolol and dyazide; denies CP; reports checking BP at home once a week-- avg 120's/70's.     HLD-- continues on atorvastatin; denies any issues.     Hx anxiety and depression-- reports mood is stable; denies any issues; denies SI.       Patient   Objective   Vital Signs:  /90 (BP Location: Left arm, Patient Position: Sitting, Cuff Size: Adult)   Pulse 52   Ht 170.2 cm (67\")   Wt 88 kg (194 lb)   SpO2 96%   BMI 30.38 kg/m²   Estimated body mass index is 30.38 kg/m² as calculated from the following:    Height as of this encounter: 170.2 cm (67\").    Weight as of this encounter: 88 kg (194 lb).               Physical Exam  Constitutional:       Appearance: Normal appearance.   HENT:      Head: Normocephalic and atraumatic.      Right Ear: External ear normal.      Left Ear: External ear normal.      Nose: Nose normal.      Mouth/Throat:      Mouth: Mucous membranes are moist.      Pharynx: Oropharynx is clear.   Eyes:      Conjunctiva/sclera: Conjunctivae normal.      Pupils: Pupils are equal, round, and reactive to light.   Cardiovascular:      Rate and Rhythm: Normal rate and regular rhythm.      Pulses: Normal pulses.           Dorsalis pedis pulses are 2+ on the right side and 2+ on the left side.        Posterior tibial pulses are 2+ on the right side and 2+ on the left side.      Heart sounds: Normal heart sounds. No murmur heard.    No gallop.   Pulmonary:      Effort: Pulmonary effort is normal. No respiratory distress.      Breath sounds: Normal breath sounds. No stridor. No wheezing, rhonchi or rales.   Musculoskeletal:      Cervical back: Normal range of " motion and neck supple.      Right foot: Normal range of motion.      Left foot: Normal range of motion.   Feet:      Right foot:      Protective Sensation: 5 sites tested.  5 sites sensed.      Skin integrity: Callus present.      Toenail Condition: Right toenails are abnormally thick.      Left foot:      Protective Sensation: 5 sites tested.  5 sites sensed.      Skin integrity: Callus present.      Toenail Condition: Left toenails are abnormally thick.   Skin:     General: Skin is warm and dry.   Neurological:      General: No focal deficit present.      Mental Status: She is alert and oriented to person, place, and time. Mental status is at baseline.   Psychiatric:         Mood and Affect: Mood normal.         Thought Content: Thought content normal.         Judgment: Judgment normal.      Result Review :  The following data was reviewed by: CRISTIAN Claros on 09/26/2023:  Common labs          12/28/2022    09:41 3/29/2023    09:30 6/26/2023    09:19   Common Labs   Glucose 124  110  117    BUN 16  13  18    Creatinine 0.81  0.74  0.91    Sodium 140  139  142    Potassium 4.3  4.4  4.3    Chloride 102  102  103    Calcium 9.9  10.1  9.8    Total Protein 7.5  7.4  7.1    Albumin 4.5  4.5  4.5    Total Bilirubin 0.5  0.7  0.5    Alkaline Phosphatase 82  75  75    AST (SGOT) 13  15  14    ALT (SGPT) 13  12  15    WBC 8.80   7.08    Hemoglobin 13.0   12.4    Hematocrit 38.8   37.5    Platelets 293   252    Total Cholesterol 170  147  144    Triglycerides 144  149  123    HDL Cholesterol 65  57  55    LDL Cholesterol  80  65  67    Hemoglobin A1C 6.80  6.70  6.30    Microalbumin, Urine  5.9                    Assessment and Plan   Diagnoses and all orders for this visit:    1. Encounter for diabetic foot exam (Primary)    2. Type 2 diabetes mellitus without complication, without long-term current use of insulin  Assessment & Plan:  Diabetes is improving with lifestyle modifications.   Dietary recommendations for  ADA diet.  Regular aerobic exercise.  Discussed ways to avoid symptomatic hypoglycemia.  Discussed sick day management.  Discussed foot care.  Reminded to get yearly retinal exam.  Diabetes will be reassessed  in 4 months .    Orders:  -     Hemoglobin A1c    3. Primary hypertension  Assessment & Plan:  Hypertension is improving with treatment.  Continue current treatment regimen.  Dietary sodium restriction.  Weight loss.  Regular aerobic exercise.  Continue current medications.  Blood pressure will be reassessed at the next regular appointment.    Orders:  -     CBC & Differential  -     Comprehensive metabolic panel    4. Mixed hyperlipidemia  Assessment & Plan:  Lipid abnormalities are improving with treatment.  Nutritional counseling was provided. and Pharmacotherapy as ordered.  Lipids will be reassessed  at next appt .    Orders:  -     Lipid panel  -     TSH Rfx On Abnormal To Free T4    5. Screening mammogram for breast cancer  -     Mammo screening digital tomosynthesis bilateral w CAD; Future             Follow Up   Return in about 4 months (around 1/26/2024) for Medicare Wellness.  Patient was given instructions and counseling regarding her condition or for health maintenance advice. Please see specific information pulled into the AVS if appropriate.

## 2023-09-26 NOTE — ASSESSMENT & PLAN NOTE
Lipid abnormalities are improving with treatment.  Nutritional counseling was provided. and Pharmacotherapy as ordered.  Lipids will be reassessed  at next appt .

## 2023-09-26 NOTE — ASSESSMENT & PLAN NOTE
Diabetes is improving with lifestyle modifications.   Dietary recommendations for ADA diet.  Regular aerobic exercise.  Discussed ways to avoid symptomatic hypoglycemia.  Discussed sick day management.  Discussed foot care.  Reminded to get yearly retinal exam.  Diabetes will be reassessed  in 4 months .

## 2023-10-01 DIAGNOSIS — I10 ESSENTIAL HYPERTENSION: ICD-10-CM

## 2023-10-02 RX ORDER — ESCITALOPRAM OXALATE 10 MG/1
TABLET ORAL
Qty: 90 TABLET | Refills: 1 | Status: SHIPPED | OUTPATIENT
Start: 2023-10-02

## 2023-10-18 ENCOUNTER — HOSPITAL ENCOUNTER (OUTPATIENT)
Dept: MAMMOGRAPHY | Facility: HOSPITAL | Age: 68
Discharge: HOME | End: 2023-10-18
Admitting: NURSE PRACTITIONER
Payer: MEDICARE

## 2023-10-18 DIAGNOSIS — Z12.31 SCREENING MAMMOGRAM FOR BREAST CANCER: ICD-10-CM

## 2023-10-18 PROCEDURE — 77067 SCR MAMMO BI INCL CAD: CPT

## 2023-10-18 PROCEDURE — 77063 BREAST TOMOSYNTHESIS BI: CPT

## 2023-10-21 DIAGNOSIS — I10 PRIMARY HYPERTENSION: Chronic | ICD-10-CM

## 2023-10-23 RX ORDER — ATENOLOL 25 MG/1
25 TABLET ORAL DAILY
Qty: 90 TABLET | Refills: 1 | Status: SHIPPED | OUTPATIENT
Start: 2023-10-23

## 2023-12-08 DIAGNOSIS — E78.2 MIXED HYPERLIPIDEMIA: Chronic | ICD-10-CM

## 2023-12-08 RX ORDER — ATORVASTATIN CALCIUM 10 MG/1
10 TABLET, FILM COATED ORAL EVERY EVENING
Qty: 90 TABLET | Refills: 0 | Status: SHIPPED | OUTPATIENT
Start: 2023-12-08

## 2024-01-18 DIAGNOSIS — I10 ESSENTIAL HYPERTENSION: ICD-10-CM

## 2024-01-18 RX ORDER — TRIAMTERENE AND HYDROCHLOROTHIAZIDE 37.5; 25 MG/1; MG/1
1 CAPSULE ORAL DAILY
Qty: 90 CAPSULE | Refills: 2 | Status: SHIPPED | OUTPATIENT
Start: 2024-01-18

## 2024-01-29 ENCOUNTER — OFFICE VISIT (OUTPATIENT)
Dept: INTERNAL MEDICINE | Facility: CLINIC | Age: 69
End: 2024-01-29
Payer: MEDICARE

## 2024-01-29 VITALS
OXYGEN SATURATION: 95 % | DIASTOLIC BLOOD PRESSURE: 62 MMHG | SYSTOLIC BLOOD PRESSURE: 100 MMHG | HEART RATE: 97 BPM | BODY MASS INDEX: 29.66 KG/M2 | WEIGHT: 189 LBS | HEIGHT: 67 IN

## 2024-01-29 DIAGNOSIS — F41.8 DEPRESSION WITH ANXIETY: Chronic | ICD-10-CM

## 2024-01-29 DIAGNOSIS — E78.2 MIXED HYPERLIPIDEMIA: Chronic | ICD-10-CM

## 2024-01-29 DIAGNOSIS — Z13.31 DEPRESSION SCREENING NEGATIVE: ICD-10-CM

## 2024-01-29 DIAGNOSIS — Z00.00 MEDICARE ANNUAL WELLNESS VISIT, SUBSEQUENT: Primary | ICD-10-CM

## 2024-01-29 DIAGNOSIS — E11.9 TYPE 2 DIABETES MELLITUS WITHOUT COMPLICATION, WITHOUT LONG-TERM CURRENT USE OF INSULIN: ICD-10-CM

## 2024-01-29 DIAGNOSIS — I10 PRIMARY HYPERTENSION: Chronic | ICD-10-CM

## 2024-01-29 DIAGNOSIS — Z01.89 ENCOUNTER FOR TOBACCO USE SCREENING: ICD-10-CM

## 2024-01-29 PROBLEM — I26.99 PULMONARY EMBOLISM: Status: RESOLVED | Noted: 2017-07-04 | Resolved: 2024-01-29

## 2024-01-29 LAB
ALBUMIN SERPL-MCNC: 4.5 G/DL (ref 3.5–5.2)
ALBUMIN/GLOB SERPL: 1.6 G/DL
ALP SERPL-CCNC: 73 U/L (ref 39–117)
ALT SERPL-CCNC: 16 U/L (ref 1–33)
AST SERPL-CCNC: 15 U/L (ref 1–32)
BASOPHILS # BLD AUTO: 0.06 10*3/MM3 (ref 0–0.2)
BASOPHILS NFR BLD AUTO: 0.9 % (ref 0–1.5)
BILIRUB SERPL-MCNC: 0.7 MG/DL (ref 0–1.2)
BUN SERPL-MCNC: 19 MG/DL (ref 8–23)
BUN/CREAT SERPL: 28.8 (ref 7–25)
CALCIUM SERPL-MCNC: 9.6 MG/DL (ref 8.6–10.5)
CHLORIDE SERPL-SCNC: 103 MMOL/L (ref 98–107)
CHOLEST SERPL-MCNC: 150 MG/DL (ref 0–200)
CO2 SERPL-SCNC: 25.2 MMOL/L (ref 22–29)
CREAT SERPL-MCNC: 0.66 MG/DL (ref 0.57–1)
EGFRCR SERPLBLD CKD-EPI 2021: 96.9 ML/MIN/1.73
EOSINOPHIL # BLD AUTO: 0.21 10*3/MM3 (ref 0–0.4)
EOSINOPHIL NFR BLD AUTO: 3.2 % (ref 0.3–6.2)
ERYTHROCYTE [DISTWIDTH] IN BLOOD BY AUTOMATED COUNT: 12.4 % (ref 12.3–15.4)
GLOBULIN SER CALC-MCNC: 2.8 GM/DL
GLUCOSE SERPL-MCNC: 112 MG/DL (ref 65–99)
HBA1C MFR BLD: 6.1 % (ref 4.8–5.6)
HCT VFR BLD AUTO: 38 % (ref 34–46.6)
HDLC SERPL-MCNC: 63 MG/DL (ref 40–60)
HGB BLD-MCNC: 12.5 G/DL (ref 12–15.9)
IMM GRANULOCYTES # BLD AUTO: 0.02 10*3/MM3 (ref 0–0.05)
IMM GRANULOCYTES NFR BLD AUTO: 0.3 % (ref 0–0.5)
LDLC SERPL CALC-MCNC: 65 MG/DL (ref 0–100)
LYMPHOCYTES # BLD AUTO: 1.7 10*3/MM3 (ref 0.7–3.1)
LYMPHOCYTES NFR BLD AUTO: 25.6 % (ref 19.6–45.3)
MCH RBC QN AUTO: 28.3 PG (ref 26.6–33)
MCHC RBC AUTO-ENTMCNC: 32.9 G/DL (ref 31.5–35.7)
MCV RBC AUTO: 86 FL (ref 79–97)
MONOCYTES # BLD AUTO: 0.46 10*3/MM3 (ref 0.1–0.9)
MONOCYTES NFR BLD AUTO: 6.9 % (ref 5–12)
NEUTROPHILS # BLD AUTO: 4.19 10*3/MM3 (ref 1.7–7)
NEUTROPHILS NFR BLD AUTO: 63.1 % (ref 42.7–76)
NRBC BLD AUTO-RTO: 0 /100 WBC (ref 0–0.2)
PLATELET # BLD AUTO: 275 10*3/MM3 (ref 140–450)
POTASSIUM SERPL-SCNC: 4.2 MMOL/L (ref 3.5–5.2)
PROT SERPL-MCNC: 7.3 G/DL (ref 6–8.5)
RBC # BLD AUTO: 4.42 10*6/MM3 (ref 3.77–5.28)
SODIUM SERPL-SCNC: 139 MMOL/L (ref 136–145)
TRIGL SERPL-MCNC: 125 MG/DL (ref 0–150)
TSH SERPL DL<=0.005 MIU/L-ACNC: 1.88 UIU/ML (ref 0.27–4.2)
VLDLC SERPL CALC-MCNC: 22 MG/DL (ref 5–40)
WBC # BLD AUTO: 6.64 10*3/MM3 (ref 3.4–10.8)

## 2024-01-29 PROCEDURE — 3078F DIAST BP <80 MM HG: CPT | Performed by: NURSE PRACTITIONER

## 2024-01-29 PROCEDURE — 3074F SYST BP LT 130 MM HG: CPT | Performed by: NURSE PRACTITIONER

## 2024-01-29 PROCEDURE — G0439 PPPS, SUBSEQ VISIT: HCPCS | Performed by: NURSE PRACTITIONER

## 2024-01-29 PROCEDURE — 99214 OFFICE O/P EST MOD 30 MIN: CPT | Performed by: NURSE PRACTITIONER

## 2024-01-29 PROCEDURE — G9903 PT SCRN TBCO ID AS NON USER: HCPCS | Performed by: NURSE PRACTITIONER

## 2024-01-29 PROCEDURE — 1170F FXNL STATUS ASSESSED: CPT | Performed by: NURSE PRACTITIONER

## 2024-01-29 PROCEDURE — 1159F MED LIST DOCD IN RCRD: CPT | Performed by: NURSE PRACTITIONER

## 2024-01-29 PROCEDURE — 1160F RVW MEDS BY RX/DR IN RCRD: CPT | Performed by: NURSE PRACTITIONER

## 2024-01-29 NOTE — PROGRESS NOTES
The ABCs of the Annual Wellness Visit  Subsequent Medicare Wellness Visit    Subjective    Karishma Ocasio is a 68 y.o. female who presents for a Subsequent Medicare Wellness Visit.    The following portions of the patient's history were reviewed and   updated as appropriate: allergies, current medications, past family history, past medical history, past social history, past surgical history, and problem list.    Compared to one year ago, the patient feels her physical   health is better.    Compared to one year ago, the patient feels her mental   health is the same.    Recent Hospitalizations:  She was not admitted to the hospital during the last year.       Current Medical Providers:  Patient Care Team:  Jacey Lockhart APRN as PCP - General (Internal Medicine)  Tim Nolasco DO as Consulting Physician (Ophthalmology)    Outpatient Medications Prior to Visit   Medication Sig Dispense Refill    atenolol (TENORMIN) 25 MG tablet TAKE 1 TABLET BY MOUTH DAILY 90 tablet 1    atorvastatin (LIPITOR) 10 MG tablet TAKE 1 TABLET BY MOUTH EVERY EVENING 90 tablet 0    betamethasone dipropionate 0.05 % cream Apply  topically to the appropriate area as directed Daily As Needed for Irritation or Rash. 15 g 1    escitalopram (LEXAPRO) 10 MG tablet TAKE 1 TABLET BY MOUTH EVERY DAY 90 tablet 1    LEVOCETIRIZINE DIHYDROCHLORIDE PO As Needed.      neomycin-polymyxin-hydrocortisone (CORTISPORIN) 1 % solution otic solution Administer 3 drops into both ears 4 (Four) Times a Day. (Patient taking differently: Administer 3 drops into both ears 4 (Four) Times a Day. As needed) 10 mL 1    triamterene-hydrochlorothiazide (DYAZIDE) 37.5-25 MG per capsule TAKE 1 CAPSULE BY MOUTH DAILY 90 capsule 2     No facility-administered medications prior to visit.       No opioid medication identified on active medication list. I have reviewed chart for other potential  high risk medication/s and harmful drug interactions in the  "elderly.        Aspirin is not on active medication list.  Aspirin use is not indicated based on review of current medical condition/s. Risk of harm outweighs potential benefits.  .    Patient Active Problem List   Diagnosis    Hypertension    Encounter for screening colonoscopy    Type 2 diabetes mellitus without complication, without long-term current use of insulin    Mixed hyperlipidemia    Healthcare maintenance    Eczema of external ear, bilateral    Vision abnormalities    Class 1 obesity due to excess calories with serious comorbidity and body mass index (BMI) of 32.0 to 32.9 in adult    Seasonal allergies     Advance Care Planning   Advance Care Planning     Advance Directive is on file.  ACP discussion was held with the patient during this visit. Working on editing recent paperwork     Objective    Vitals:    24 0759   BP: 100/62   BP Location: Left arm   Patient Position: Sitting   Cuff Size: Adult   Pulse: 97   SpO2: 95%   Weight: 85.7 kg (189 lb)   Height: 170.2 cm (67\")     Estimated body mass index is 29.6 kg/m² as calculated from the following:    Height as of this encounter: 170.2 cm (67\").    Weight as of this encounter: 85.7 kg (189 lb).           Does the patient have evidence of cognitive impairment? No          HEALTH RISK ASSESSMENT    Smoking Status:  Social History     Tobacco Use   Smoking Status Former    Packs/day: 0.50    Years: 15.00    Additional pack years: 0.00    Total pack years: 7.50    Types: Cigarettes    Quit date: 2000    Years since quittin.0   Smokeless Tobacco Never     Alcohol Consumption:  Social History     Substance and Sexual Activity   Alcohol Use No     Fall Risk Screen:    STEADI Fall Risk Assessment was completed, and patient is at LOW risk for falls.Assessment completed on:2024    Depression Screenin/29/2024     8:02 AM   PHQ-2/PHQ-9 Depression Screening   Little Interest or Pleasure in Doing Things 0-->not at all   Feeling Down, " Depressed or Hopeless 0-->not at all   PHQ-9: Brief Depression Severity Measure Score 0       Health Habits and Functional and Cognitive Screenin/29/2024     8:01 AM   Functional & Cognitive Status   Do you have difficulty preparing food and eating? No   Do you have difficulty bathing yourself, getting dressed or grooming yourself? No   Do you have difficulty using the toilet? No   Do you have difficulty moving around from place to place? No   Do you have trouble with steps or getting out of a bed or a chair? No   Current Diet Other   Dental Exam Up to date   Eye Exam Up to date   Exercise (times per week) 6 times per week   Current Exercises Include Treadmill;Stationary Bicycling/Spin Class;Weightlifting   Do you need help using the phone?  No   Are you deaf or do you have serious difficulty hearing?  No   Do you need help to go to places out of walking distance? No   Do you need help shopping? No   Do you need help preparing meals?  No   Do you need help with housework?  No   Do you need help with laundry? No   Do you need help taking your medications? No   Do you need help managing money? No   Do you ever drive or ride in a car without wearing a seat belt? No   Have you felt unusual stress, anger or loneliness in the last month? No   Who do you live with? Spouse   If you need help, do you have trouble finding someone available to you? No   Do you have difficulty concentrating, remembering or making decisions? No       Age-appropriate Screening Schedule:  Refer to the list below for future screening recommendations based on patient's age, sex and/or medical conditions. Orders for these recommended tests are listed in the plan section. The patient has been provided with a written plan.    Health Maintenance   Topic Date Due    TDAP/TD VACCINES (1 - Tdap) 2024 (Originally 1974)    COVID-19 Vaccine (2023-24 season) 2024 (Originally 2023)    HEMOGLOBIN A1C  2024    URINE  "MICROALBUMIN  03/29/2024    BMI FOLLOWUP  03/29/2024    DIABETIC EYE EXAM  05/30/2024    DIABETIC FOOT EXAM  09/26/2024    LIPID PANEL  09/26/2024    MAMMOGRAM  10/18/2024    ANNUAL WELLNESS VISIT  01/29/2025    DXA SCAN  07/12/2025    COLORECTAL CANCER SCREENING  03/30/2028    INFLUENZA VACCINE  Completed    Pneumococcal Vaccine 65+  Completed    ZOSTER VACCINE  Completed    HEPATITIS C SCREENING  Addressed    PAP SMEAR  Discontinued                  CMS Preventative Services Quick Reference  Risk Factors Identified During Encounter  Immunizations Discussed/Encouraged: Tdap, covid booster  Dental Screening Recommended  Vision Screening Recommended  The above risks/problems have been discussed with the patient.  Pertinent information has been shared with the patient in the After Visit Summary.  An After Visit Summary and PPPS were made available to the patient.    Follow Up:   Next Medicare Wellness visit to be scheduled in 1 year.       Additional E&M Note during same encounter follows:  Patient has multiple medical problems which are significant and separately identifiable that require additional work above and beyond the Medicare Wellness Visit.      Chief Complaint  Medicare Wellness-subsequent    Subjective        HPI  Karishma Ocasio is also being seen today for chronic health conditions.     HTN-- cont on atenolol, dyazide; reports she is checking BP weekly-- avg 120-130's/70's. Denies CP.     HLD-- Cont on atorvastatin; cont with regular exercise. Denies any issues.     DM2-- continues with dietary modification; UTD with diabetic eye exam. Denies any issues with foot health.     Hx anxiety and depression-- reports mood is stable; continues on lexapro. Denies SI.          Objective   Vital Signs:  /62 (BP Location: Left arm, Patient Position: Sitting, Cuff Size: Adult)   Pulse 97   Ht 170.2 cm (67\")   Wt 85.7 kg (189 lb)   SpO2 95%   BMI 29.60 kg/m²     Physical Exam  Constitutional:       " Appearance: Normal appearance.   HENT:      Head: Normocephalic and atraumatic.      Right Ear: External ear normal.      Left Ear: External ear normal.      Nose: Nose normal.      Mouth/Throat:      Mouth: Mucous membranes are moist.      Pharynx: Oropharynx is clear.   Eyes:      Conjunctiva/sclera: Conjunctivae normal.      Pupils: Pupils are equal, round, and reactive to light.   Neck:      Vascular: No carotid bruit.   Cardiovascular:      Rate and Rhythm: Normal rate and regular rhythm.      Pulses: Normal pulses.      Heart sounds: Normal heart sounds. No murmur heard.     No gallop.   Pulmonary:      Effort: Pulmonary effort is normal. No respiratory distress.      Breath sounds: Normal breath sounds. No stridor. No wheezing, rhonchi or rales.   Musculoskeletal:      Cervical back: Normal range of motion and neck supple.   Skin:     General: Skin is warm and dry.   Neurological:      Mental Status: She is alert and oriented to person, place, and time. Mental status is at baseline.   Psychiatric:         Mood and Affect: Mood normal.         Thought Content: Thought content normal.         Judgment: Judgment normal.          The following data was reviewed by: CRISTIAN Claros on 01/29/2024:  Common labs          3/29/2023    09:30 6/26/2023    09:19 9/26/2023    00:00   Common Labs   Glucose 110  117  112    BUN 13  18  18    Creatinine 0.74  0.91  0.73    Sodium 139  142  141    Potassium 4.4  4.3  4.3    Chloride 102  103  100    Calcium 10.1  9.8  10.1    Total Protein 7.4  7.1  7.5    Albumin 4.5  4.5  4.9    Total Bilirubin 0.7  0.5  0.8    Alkaline Phosphatase 75  75  74    AST (SGOT) 15  14  20    ALT (SGPT) 12  15  16    WBC  7.08  6.98    Hemoglobin  12.4  13.1    Hematocrit  37.5  39.5    Platelets  252  270    Total Cholesterol 147  144  154    Triglycerides 149  123  120    HDL Cholesterol 57  55  59    LDL Cholesterol  65  67  74    Hemoglobin A1C 6.70  6.30  6.30    Microalbumin, Urine 5.9         PHQ-9 Depression Screening  Little interest or pleasure in doing things? 0-->not at all   Feeling down, depressed, or hopeless? 0-->not at all   Trouble falling or staying asleep, or sleeping too much?  0   Feeling tired or having little energy?  0   Poor appetite or overeating?  0   Feeling bad about yourself - or that you are a failure or have let yourself or your family down?  0   Trouble concentrating on things, such as reading the newspaper or watching television?  0   Moving or speaking so slowly that other people could have noticed? Or the opposite - being so fidgety or restless that you have been moving around a lot more than usual?  0   Thoughts that you would be better off dead, or of hurting yourself in some way?  0   PHQ-9 Total Score 0   If you checked off any problems, how difficult have these problems made it for you to do your work, take care of things at home, or get along with other people?     6 minutes spent discussing depression screening today; denies SI  Tobacco Use: Medium Risk (1/29/2024)    Patient History     Smoking Tobacco Use: Former     Smokeless Tobacco Use: Never     Passive Exposure: Not on file     Social History     Substance and Sexual Activity   Alcohol Use No     Family History   Problem Relation Age of Onset    Heart disease Mother     Hypertension Mother     Vision loss Mother         macular degeneration    Heart disease Father     Hypertension Father     No Known Problems Son     No Known Problems Son     No Known Problems Sister     No Known Problems Sister     No Known Problems Other                 Assessment and Plan   Diagnoses and all orders for this visit:    1. Medicare annual wellness visit, subsequent (Primary)    2. Primary hypertension  Assessment & Plan:  Hypertension is improving with treatment.  Continue current treatment regimen.  Dietary sodium restriction.  Regular aerobic exercise.  Continue current medications.  Blood pressure will be reassessed at  the next regular appointment.    Orders:  -     CBC & Differential  -     Comprehensive metabolic panel    3. Mixed hyperlipidemia  Assessment & Plan:  Lipid abnormalities are improving with treatment.  Nutritional counseling was provided. and Pharmacotherapy as ordered.  Lipids will be reassessed in 6 months.    Orders:  -     TSH Rfx On Abnormal To Free T4  -     Lipid panel    4. Type 2 diabetes mellitus without complication, without long-term current use of insulin  Assessment & Plan:  Diabetes is improving with treatment.   Continue current treatment regimen.  Dietary recommendations for ADA diet.  Regular aerobic exercise.  Discussed ways to avoid symptomatic hypoglycemia.  Discussed sick day management.  Discussed foot care.  Reminded to get yearly retinal exam.  Diabetes will be reassessed in 6 months.  Continues with dietary modification    Orders:  -     Hemoglobin A1c    5. Depression with anxiety  Assessment & Plan:  Patient's depression is recurrent and is mild without psychosis. Their depression is currently in full remission and the condition is improving with treatment. This will be reassessed at the next regular appointment. F/U as described:patient will continue current medication therapy.      6. Depression screening negative [Z13.31]    7. Encounter for tobacco use screening [Z01.89]             Follow Up   Return in about 6 months (around 7/29/2024) for Recheck chronic health conditions.  Patient was given instructions and counseling regarding her condition or for health maintenance advice. Please see specific information pulled into the AVS if appropriate.

## 2024-01-29 NOTE — ASSESSMENT & PLAN NOTE
Diabetes is improving with treatment.   Continue current treatment regimen.  Dietary recommendations for ADA diet.  Regular aerobic exercise.  Discussed ways to avoid symptomatic hypoglycemia.  Discussed sick day management.  Discussed foot care.  Reminded to get yearly retinal exam.  Diabetes will be reassessed in 6 months.  Continues with dietary modification

## 2024-03-08 DIAGNOSIS — E78.2 MIXED HYPERLIPIDEMIA: Chronic | ICD-10-CM

## 2024-03-08 RX ORDER — ATORVASTATIN CALCIUM 10 MG/1
10 TABLET, FILM COATED ORAL EVERY EVENING
Qty: 90 TABLET | Refills: 1 | Status: SHIPPED | OUTPATIENT
Start: 2024-03-08

## 2024-03-30 DIAGNOSIS — I10 ESSENTIAL HYPERTENSION: ICD-10-CM

## 2024-04-01 RX ORDER — ESCITALOPRAM OXALATE 10 MG/1
TABLET ORAL
Qty: 90 TABLET | Refills: 1 | Status: SHIPPED | OUTPATIENT
Start: 2024-04-01

## 2024-04-19 DIAGNOSIS — I10 PRIMARY HYPERTENSION: Chronic | ICD-10-CM

## 2024-04-19 RX ORDER — ATENOLOL 25 MG/1
25 TABLET ORAL DAILY
Qty: 90 TABLET | Refills: 1 | Status: SHIPPED | OUTPATIENT
Start: 2024-04-19

## 2024-05-23 NOTE — PROGRESS NOTES
Please kindly generate referral for   Orders Placed This Encounter    SERVICE TO SLEEP MEDICINE      Please let patient know when ready. Thank you.   Subjective   Karishma Ocasio is a 61 y.o. female.     Hypertension   This is a chronic problem. Associated symptoms include palpitations.   Palpitations    This is a new problem. The current episode started 1 to 4 weeks ago.        The following portions of the patient's history were reviewed and updated as appropriate: allergies, current medications, past family history, past medical history, past social history, past surgical history and problem list.    Review of Systems   Constitutional:        Doing OK   HENT: Negative.    Eyes: Negative.    Respiratory: Negative.         Has questions about having recurrent PE Had them 17 years ago & will be flying to [place] fo a week   Cardiovascular: Positive for palpitations.        Has been checking BP more lately & has been good but pulse has been elevated rhythm is regular No pounding  Or sOB   Gastrointestinal: Negative.    Genitourinary: Negative.    Neurological: Negative.        Objective   Physical Exam   Constitutional: She is oriented to person, place, and time. She appears well-developed.   HENT:   Head: Normocephalic.   Eyes: EOM are normal.   Neck: Neck supple.   Cardiovascular: Normal rate, regular rhythm and normal heart sounds.    Repeat 140/90   Pulmonary/Chest: Effort normal and breath sounds normal.   Musculoskeletal: Normal range of motion.   Neurological: She is alert and oriented to person, place, and time.   Vitals reviewed.      Assessment/Plan   Karishma was seen today for hypertension and palpitations.    Diagnoses and all orders for this visit:    Essential hypertension    Palpitations  -     Holter Monitor - 24 Hour; Future  -     Adult Transthoracic Echo Complete; Future    Hx pulmonary embolism  -     apixaban (ELIQUIS) 5 MG tablet tablet; Take 1 tablet by mouth 2 (Two) Times a Day.

## 2024-07-29 ENCOUNTER — OFFICE VISIT (OUTPATIENT)
Dept: INTERNAL MEDICINE | Facility: CLINIC | Age: 69
End: 2024-07-29
Payer: MEDICARE

## 2024-07-29 VITALS
HEART RATE: 76 BPM | BODY MASS INDEX: 29.51 KG/M2 | WEIGHT: 188 LBS | SYSTOLIC BLOOD PRESSURE: 120 MMHG | DIASTOLIC BLOOD PRESSURE: 74 MMHG | OXYGEN SATURATION: 95 % | HEIGHT: 67 IN

## 2024-07-29 DIAGNOSIS — I10 PRIMARY HYPERTENSION: Primary | Chronic | ICD-10-CM

## 2024-07-29 DIAGNOSIS — E78.2 MIXED HYPERLIPIDEMIA: Chronic | ICD-10-CM

## 2024-07-29 DIAGNOSIS — E11.9 TYPE 2 DIABETES MELLITUS WITHOUT COMPLICATION, WITHOUT LONG-TERM CURRENT USE OF INSULIN: Chronic | ICD-10-CM

## 2024-07-29 DIAGNOSIS — Z12.31 SCREENING MAMMOGRAM FOR BREAST CANCER: ICD-10-CM

## 2024-07-29 DIAGNOSIS — E66.3 OVERWEIGHT WITH BODY MASS INDEX (BMI) OF 29 TO 29.9 IN ADULT: ICD-10-CM

## 2024-07-29 PROCEDURE — G2211 COMPLEX E/M VISIT ADD ON: HCPCS | Performed by: NURSE PRACTITIONER

## 2024-07-29 PROCEDURE — 1159F MED LIST DOCD IN RCRD: CPT | Performed by: NURSE PRACTITIONER

## 2024-07-29 PROCEDURE — 3074F SYST BP LT 130 MM HG: CPT | Performed by: NURSE PRACTITIONER

## 2024-07-29 PROCEDURE — 99214 OFFICE O/P EST MOD 30 MIN: CPT | Performed by: NURSE PRACTITIONER

## 2024-07-29 PROCEDURE — 3078F DIAST BP <80 MM HG: CPT | Performed by: NURSE PRACTITIONER

## 2024-07-29 PROCEDURE — 3044F HG A1C LEVEL LT 7.0%: CPT | Performed by: NURSE PRACTITIONER

## 2024-07-29 PROCEDURE — 1126F AMNT PAIN NOTED NONE PRSNT: CPT | Performed by: NURSE PRACTITIONER

## 2024-07-29 PROCEDURE — 1160F RVW MEDS BY RX/DR IN RCRD: CPT | Performed by: NURSE PRACTITIONER

## 2024-07-29 RX ORDER — LEVOCETIRIZINE DIHYDROCHLORIDE 5 MG/1
5 TABLET, FILM COATED ORAL EVERY EVENING
COMMUNITY

## 2024-07-29 NOTE — ASSESSMENT & PLAN NOTE
Lipid abnormalities are improving with treatment    Plan:  Continue same medication/s without change.      Discussed medication dosage, use, side effects, and goals of treatment in detail.    Counseled patient on lifestyle modifications to help control hyperlipidemia.     Patient Treatment Goals:   LDL goal is less than 70    Followup at the next regular appointment.   Disimpaction Protocol:  1/4 of the 527 g bottle (=7.5 capfuls) of Miralax  with 24 oz Gatorade (cold)  Drink 6 oz every 2 hours  Until gone  Plus Ex-Lax (OTC) with square    If this alone does not work, then repeat tomorrow.    If it does work, then starting tomorrow start maintenance treatment:  Miralax 1 cap 1-2 times per day

## 2024-07-29 NOTE — PROGRESS NOTES
"Chief Complaint  Hyperlipidemia and Hypertension    Subjective        Karishma Ocasio presents to [facility] PRIMARY [facility]  History of Present Illness  This is a 70 y/o female presenting to office for chronic health management.     DM2-- continues with diet and exercise;     HLD-- continues on statin therapy; following with healthy exercise and diet.     HTN-- denies CP or SOA; continues on atenolol; monitoring BP at home.     Hx anxiety-- stable on lexapro; denies SI.     Objective   Vital Signs:  /74 (BP Location: Left arm, Patient Position: Sitting, Cuff Size: Adult)   Pulse 76   Ht 170.2 cm (67\")   Wt 85.3 kg (188 lb)   SpO2 95%   BMI 29.44 kg/m²   Estimated body mass index is 29.44 kg/m² as calculated from the following:    Height as of this encounter: 170.2 cm (67\").    Weight as of this encounter: 85.3 kg (188 lb).       BMI is >= 25 and <30. (Overweight) The following options were offered after discussion;: exercise counseling/recommendations and nutrition counseling/recommendations      Physical Exam  Constitutional:       General: She is awake.      Appearance: Normal appearance.   HENT:      Head: Normocephalic and atraumatic.      Right Ear: Hearing and external ear normal.      Left Ear: Hearing and external ear normal.      Nose: Nose normal.      Mouth/Throat:      Lips: Pink.      Mouth: Mucous membranes are moist.      Pharynx: Oropharynx is clear.   Eyes:      Extraocular Movements: Extraocular movements intact.      Conjunctiva/sclera: Conjunctivae normal.      Pupils: Pupils are equal, round, and reactive to light.   Neck:      Vascular: No carotid bruit.   Cardiovascular:      Rate and Rhythm: Normal rate and regular rhythm.      Pulses: Normal pulses.      Heart sounds: Normal heart sounds. No murmur heard.     No gallop.   Pulmonary:      Effort: Pulmonary effort is normal. No respiratory distress.      Breath sounds: Normal breath sounds. No stridor. No wheezing, " rhonchi or rales.   Musculoskeletal:      Cervical back: Normal range of motion and neck supple.   Skin:     General: Skin is warm and dry.      Capillary Refill: Capillary refill takes less than 2 seconds.   Neurological:      General: No focal deficit present.      Mental Status: She is alert and oriented to person, place, and time. Mental status is at baseline.      Motor: Motor function is intact.      Coordination: Coordination is intact.      Gait: Gait is intact.      Deep Tendon Reflexes: Reflexes are normal and symmetric.   Psychiatric:         Attention and Perception: Attention normal.         Mood and Affect: Mood normal.         Speech: Speech normal.         Behavior: Behavior normal. Behavior is cooperative.         Thought Content: Thought content normal.         Cognition and Memory: Cognition normal.         Judgment: Judgment normal.        Result Review :    The following data was reviewed by: CRISTIAN Claros on 07/29/2024:  Common labs          9/26/2023    00:00 1/29/2024    08:40   Common Labs   Glucose 112  112    BUN 18  19    Creatinine 0.73  0.66    Sodium 141  139    Potassium 4.3  4.2    Chloride 100  103    Calcium 10.1  9.6    Total Protein 7.5  7.3    Albumin 4.9  4.5    Total Bilirubin 0.8  0.7    Alkaline Phosphatase 74  73    AST (SGOT) 20  15    ALT (SGPT) 16  16    WBC 6.98  6.64    Hemoglobin 13.1  12.5    Hematocrit 39.5  38.0    Platelets 270  275    Total Cholesterol 154  150    Triglycerides 120  125    HDL Cholesterol 59  63    LDL Cholesterol  74  65    Hemoglobin A1C 6.30  6.10      Tobacco Use: Medium Risk (7/29/2024)    Patient History     Smoking Tobacco Use: Former     Smokeless Tobacco Use: Never     Passive Exposure: Not on file     Social History     Substance and Sexual Activity   Alcohol Use No     Family History   Problem Relation Age of Onset    Heart disease Mother     Hypertension Mother     Vision loss Mother         macular degeneration    Heart disease  Father     Hypertension Father     No Known Problems Son     No Known Problems Son     No Known Problems Sister     No Known Problems Sister     No Known Problems Other                   Assessment and Plan     Diagnoses and all orders for this visit:    1. Primary hypertension (Primary)  Assessment & Plan:  Hypertension is stable and controlled  Continue current treatment regimen.  Dietary sodium restriction.  Regular aerobic exercise.  Blood pressure will be reassessed in 6 months.    Orders:  -     Comprehensive metabolic panel  -     CBC & Differential    2. Type 2 diabetes mellitus without complication, without long-term current use of insulin  Assessment & Plan:  Diabetes is improving with treatment.   Continue current treatment regimen.  Recommended an ADA diet.  Regular aerobic exercise.  Discussed ways to avoid symptomatic hypoglycemia.  Discussed sick day management.  Discussed foot care.  Reminded to get yearly retinal exam.  Diabetes will be reassessed in 6 months    Orders:  -     Microalbumin / Creatinine Urine Ratio - Urine, Clean Catch  -     Hemoglobin A1c    3. Mixed hyperlipidemia  Assessment & Plan:   Lipid abnormalities are improving with treatment    Plan:  Continue same medication/s without change.      Discussed medication dosage, use, side effects, and goals of treatment in detail.    Counseled patient on lifestyle modifications to help control hyperlipidemia.     Patient Treatment Goals:   LDL goal is less than 70    Followup at the next regular appointment.    Orders:  -     TSH Rfx On Abnormal To Free T4  -     Lipid panel    4. Overweight with body mass index (BMI) of 29 to 29.9 in adult  Assessment & Plan:  BMI is >= 25 and <30. (Overweight) The following options were offered after discussion;: exercise counseling/recommendations and nutrition counseling/recommendations      5. Screening mammogram for breast cancer  -     Mammo Screening Digital Tomosynthesis Bilateral With CAD;  Future             Follow Up     Return in about 6 months (around 1/29/2025) for Medicare Wellness.  Patient was given instructions and counseling regarding her condition or for health maintenance advice. Please see specific information pulled into the AVS if appropriate.         Answers submitted by the patient for this visit:  Other (Submitted on 7/27/2024)  Please describe your symptoms.: No symptons . Routine checkup  Have you had these symptoms before?: No  How long have you been having these symptoms?: Greater than 2 weeks  Primary Reason for Visit (Submitted on 7/27/2024)  What is the primary reason for your visit?: Other     80

## 2024-07-30 LAB
ALBUMIN SERPL-MCNC: 4.4 G/DL (ref 3.9–4.9)
ALBUMIN/CREAT UR: 7 MG/G CREAT (ref 0–29)
ALP SERPL-CCNC: 78 IU/L (ref 44–121)
ALT SERPL-CCNC: 14 IU/L (ref 0–32)
AST SERPL-CCNC: 17 IU/L (ref 0–40)
BASOPHILS # BLD AUTO: 0.1 X10E3/UL (ref 0–0.2)
BASOPHILS NFR BLD AUTO: 1 %
BILIRUB SERPL-MCNC: 0.5 MG/DL (ref 0–1.2)
BUN SERPL-MCNC: 23 MG/DL (ref 8–27)
BUN/CREAT SERPL: 28 (ref 12–28)
CALCIUM SERPL-MCNC: 9.8 MG/DL (ref 8.7–10.3)
CHLORIDE SERPL-SCNC: 99 MMOL/L (ref 96–106)
CHOLEST SERPL-MCNC: 148 MG/DL (ref 100–199)
CO2 SERPL-SCNC: 24 MMOL/L (ref 20–29)
CREAT SERPL-MCNC: 0.83 MG/DL (ref 0.57–1)
CREAT UR-MCNC: 75.6 MG/DL
EGFRCR SERPLBLD CKD-EPI 2021: 77 ML/MIN/1.73
EOSINOPHIL # BLD AUTO: 0.3 X10E3/UL (ref 0–0.4)
EOSINOPHIL NFR BLD AUTO: 4 %
ERYTHROCYTE [DISTWIDTH] IN BLOOD BY AUTOMATED COUNT: 13.1 % (ref 11.7–15.4)
GLOBULIN SER CALC-MCNC: 2.8 G/DL (ref 1.5–4.5)
GLUCOSE SERPL-MCNC: 104 MG/DL (ref 70–99)
HBA1C MFR BLD: 6.2 % (ref 4.8–5.6)
HCT VFR BLD AUTO: 39.8 % (ref 34–46.6)
HDLC SERPL-MCNC: 65 MG/DL
HGB BLD-MCNC: 13 G/DL (ref 11.1–15.9)
IMM GRANULOCYTES # BLD AUTO: 0 X10E3/UL (ref 0–0.1)
IMM GRANULOCYTES NFR BLD AUTO: 0 %
LDLC SERPL CALC-MCNC: 65 MG/DL (ref 0–99)
LYMPHOCYTES # BLD AUTO: 1.8 X10E3/UL (ref 0.7–3.1)
LYMPHOCYTES NFR BLD AUTO: 28 %
MCH RBC QN AUTO: 28.1 PG (ref 26.6–33)
MCHC RBC AUTO-ENTMCNC: 32.7 G/DL (ref 31.5–35.7)
MCV RBC AUTO: 86 FL (ref 79–97)
MICROALBUMIN UR-MCNC: 5.5 UG/ML
MONOCYTES # BLD AUTO: 0.6 X10E3/UL (ref 0.1–0.9)
MONOCYTES NFR BLD AUTO: 9 %
NEUTROPHILS # BLD AUTO: 3.8 X10E3/UL (ref 1.4–7)
NEUTROPHILS NFR BLD AUTO: 58 %
PLATELET # BLD AUTO: 274 X10E3/UL (ref 150–450)
POTASSIUM SERPL-SCNC: 4.4 MMOL/L (ref 3.5–5.2)
PROT SERPL-MCNC: 7.2 G/DL (ref 6–8.5)
RBC # BLD AUTO: 4.63 X10E6/UL (ref 3.77–5.28)
SODIUM SERPL-SCNC: 138 MMOL/L (ref 134–144)
TRIGL SERPL-MCNC: 102 MG/DL (ref 0–149)
TSH SERPL DL<=0.005 MIU/L-ACNC: 1.65 UIU/ML (ref 0.45–4.5)
VLDLC SERPL CALC-MCNC: 18 MG/DL (ref 5–40)
WBC # BLD AUTO: 6.6 X10E3/UL (ref 3.4–10.8)

## 2024-09-07 DIAGNOSIS — E78.2 MIXED HYPERLIPIDEMIA: Chronic | ICD-10-CM

## 2024-09-09 RX ORDER — ATORVASTATIN CALCIUM 10 MG/1
10 TABLET, FILM COATED ORAL EVERY EVENING
Qty: 90 TABLET | Refills: 1 | Status: SHIPPED | OUTPATIENT
Start: 2024-09-09

## 2024-09-11 ENCOUNTER — HOSPITAL ENCOUNTER (EMERGENCY)
Facility: HOSPITAL | Age: 69
Discharge: HOME | End: 2024-09-11
Attending: EMERGENCY MEDICINE
Payer: MEDICARE

## 2024-09-11 VITALS
HEART RATE: 109 BPM | SYSTOLIC BLOOD PRESSURE: 142 MMHG | DIASTOLIC BLOOD PRESSURE: 72 MMHG | OXYGEN SATURATION: 97 % | TEMPERATURE: 97.2 F | RESPIRATION RATE: 16 BRPM

## 2024-09-11 DIAGNOSIS — S01.01XA LACERATION OF SCALP, INITIAL ENCOUNTER: Primary | ICD-10-CM

## 2024-09-11 PROCEDURE — 90471 IMMUNIZATION ADMIN: CPT | Performed by: PHYSICIAN ASSISTANT

## 2024-09-11 PROCEDURE — 99282 EMERGENCY DEPT VISIT SF MDM: CPT

## 2024-09-11 PROCEDURE — 25010000002 TETANUS-DIPHTH-ACELL PERTUSSIS 5-2.5-18.5 LF-MCG/0.5 SUSPENSION PREFILLED SYRINGE: Performed by: PHYSICIAN ASSISTANT

## 2024-09-11 PROCEDURE — 90715 TDAP VACCINE 7 YRS/> IM: CPT | Performed by: PHYSICIAN ASSISTANT

## 2024-09-11 RX ORDER — LIDOCAINE HYDROCHLORIDE AND EPINEPHRINE 10; 10 MG/ML; UG/ML
10 INJECTION, SOLUTION INFILTRATION; PERINEURAL ONCE
Status: COMPLETED | OUTPATIENT
Start: 2024-09-11 | End: 2024-09-11

## 2024-09-11 RX ADMIN — LIDOCAINE HYDROCHLORIDE,EPINEPHRINE BITARTRATE 10 ML: 10; .01 INJECTION, SOLUTION INFILTRATION; PERINEURAL at 16:14

## 2024-09-11 RX ADMIN — TETANUS TOXOID, REDUCED DIPHTHERIA TOXOID AND ACELLULAR PERTUSSIS VACCINE, ADSORBED 0.5 ML: 5; 2.5; 8; 8; 2.5 SUSPENSION INTRAMUSCULAR at 16:13

## 2024-09-11 NOTE — ED TRIAGE NOTES
Patient to ER via car from [facility] for hitting head on corner of house  Patient has lac  Patient no loc no blood thinners

## 2024-09-11 NOTE — ED PROVIDER NOTES
EMERGENCY DEPARTMENT ENCOUNTER    Room Number:  S01/01  Date of encounter:  9/11/2024  PCP: Jacey Lockhart APRN  Historian: Patient  Chronic or social conditions impacting care (social determinants of health): None    HPI:  Chief Complaint: Head injury  A complete HPI/ROS/PMH/PSH/SH/FH are unobtainable due to: Nothing    Context: Karishma Ocasio is a 69 y.o. female with a history of diabetes, hypertension, who presents to the ED c/o acute head injury prior to arrival.  Patient states that she had bent over outside and hit her head on the corner of the house.  Patient states she fell down her did not lose consciousness.  She denies any headache, neck pain, nausea, vomiting.  She takes no blood thinners.  Unknown last tetanus shot.    Review of prior external notes (non-ED):   I reviewed internal medicine office visit from 7/25/2024.  Patient being followed for diabetes, hypertension.    Review of prior external test results outside of this encounter:  I reviewed a CMP from 7/29/24.  Creatinine 0.83, potassium 4.4    PAST MEDICAL HISTORY  Active Ambulatory Problems     Diagnosis Date Noted    Hypertension     Encounter for screening colonoscopy 03/06/2018    Type 2 diabetes mellitus without complication, without long-term current use of insulin 04/19/2021    Mixed hyperlipidemia 04/19/2021    Healthcare maintenance 04/19/2021    Eczema of external ear, bilateral 12/28/2022    Vision abnormalities 03/29/2023    Class 1 obesity due to excess calories with serious comorbidity and body mass index (BMI) of 32.0 to 32.9 in adult 03/29/2023    Seasonal allergies 06/26/2023    Overweight with body mass index (BMI) of 29 to 29.9 in adult 07/29/2024     Resolved Ambulatory Problems     Diagnosis Date Noted    Anxiety     Osteopenia     PE (pulmonary embolism) 07/04/2017    Depression with anxiety 04/19/2021     Past Medical History:   Diagnosis Date    Deep vein thrombosis In 2000    History of DVT (deep vein thrombosis) 2000     History of pulmonary embolus (PE) 2000    Obesity     Presence of vena cava filter          PAST SURGICAL HISTORY  Past Surgical History:   Procedure Laterality Date    COLONOSCOPY      COLONOSCOPY N/A 3/30/2018    Procedure: COLONOSCOPY to cecum;  Surgeon: Pierre Gavin Jr., MD;  Location: [facility] ENDOSCOPY;  Service: Gastroenterology    MAMMO BILATERAL  2019    Normal    PAP SMEAR  2017    Normal         FAMILY HISTORY  Family History   Problem Relation Age of Onset    Heart disease Mother     Hypertension Mother     Vision loss Mother         macular degeneration    Heart disease Father     Hypertension Father     No Known Problems Son     No Known Problems Son     No Known Problems Sister     No Known Problems Sister     No Known Problems Other          SOCIAL HISTORY  Social History     Socioeconomic History    Marital status:    Tobacco Use    Smoking status: Former     Current packs/day: 0.00     Average packs/day: 0.5 packs/day for 15.0 years (7.5 ttl pk-yrs)     Types: Cigarettes     Start date: 1985     Quit date: 2000     Years since quittin.7    Smokeless tobacco: Never   Vaping Use    Vaping status: Never Used   Substance and Sexual Activity    Alcohol use: No    Drug use: No    Sexual activity: Not Currently     Partners: Male         ALLERGIES  Patient has no known allergies.        REVIEW OF SYSTEMS  All systems reviewed and negative except for those discussed in HPI.       PHYSICAL EXAM    I have reviewed the triage vital signs and nursing notes.    ED Triage Vitals [24 1528]   Temp Heart Rate Resp BP SpO2   97.2 °F (36.2 °C) 109 16 -- 97 %      Temp src Heart Rate Source Patient Position BP Location FiO2 (%)   -- -- -- -- --       Physical Exam  GENERAL: Alert, oriented, not distressed  HENT: Laceration to the midline parietal scalp.  No palpable skull fracture or significant hematoma.  No cervical tenderness.  EYES: no scleral icterus, EOMI  CV: regular rhythm,  regular rate, no murmur  RESPIRATORY: normal effort, CTA  ABDOMEN: soft, nontender  MUSCULOSKELETAL: no deformity, FROM, no calf swelling or tenderness  NEURO: alert, moves all extremities, follows commands  SKIN: warm, dry      MEDICATIONS GIVEN IN ER    Medications   lidocaine 1% - EPINEPHrine 1:[id number] (XYLOCAINE W/EPI) 1 %-1:975239 injection 10 mL (10 mL Injection Given 9/11/24 1614)   Tetanus-Diphth-Acell Pertussis (BOOSTRIX) injection 0.5 mL (0.5 mL Intramuscular Given 9/11/24 1613)     Laceration Repair    Date/Time: 9/11/2024 7:12 PM    Performed by: Cruzito Calloway PA  Authorized by: Sánchez Guevara MD    Consent:     Consent obtained:  Verbal    Consent given by:  Patient  Universal protocol:     Patient identity confirmed:  Verbally with patient  Anesthesia:     Anesthesia method:  Local infiltration    Local anesthetic:  Lidocaine 1% WITH epi  Laceration details:     Location:  Scalp    Scalp location:  Mid-scalp    Length (cm):  2.9  Pre-procedure details:     Preparation:  Patient was prepped and draped in usual sterile fashion  Exploration:     Hemostasis achieved with:  Epinephrine    Wound extent: no foreign bodies/material noted and no underlying fracture noted    Treatment:     Area cleansed with:  Chlorhexidine    Amount of cleaning:  Standard    Irrigation solution:  Sterile saline  Skin repair:     Repair method:  Staples    Number of staples:  4  Approximation:     Approximation:  Close  Repair type:     Repair type:  Simple  Post-procedure details:     Dressing:  Antibiotic ointment    Procedure completion:  Tolerated well, no immediate complications        ADDITIONAL ORDERS CONSIDERED BUT NOT ORDERED:  Considered obtaining a head CT however patient is not on any blood thinners.  She has no neurodeficits.  Extremely low suspicion of intracranial hemorrhage.    PROGRESS, DATA ANALYSIS, CONSULTS, AND MEDICAL DECISION MAKING    All labs have been independently interpreted by myself.  All radiology  studies have been independently interpreted by myself and discussed with radiologist dictating the report.   EKG's independently interpreted by myself.  Discussion below represents my analysis of pertinent findings related to patient's condition, differential diagnosis, treatment plan and final disposition.    I have discussed case with Dr. Guevara, emergency room physician.  He has performed his own bedside examination and agrees with treatment plan.    ED Course as of 09/11/24 1911   Wed Sep 11, 2024   1551 Patient presents with head injury prior to arrival.  Patient bent over and hit her head on a corner.  No LOC, blood thinners.  She appears well.  I do not believe CT imaging is warranted.  Plan for wound closure. [EE]      ED Course User Index  [EE] Cruzito Calloway PA       AS OF 19:11 EDT VITALS:    BP - 142/72  HR - 109  TEMP - 97.2 °F (36.2 °C)  O2 SATS - 97%        DIAGNOSIS  Final diagnoses:   Laceration of scalp, initial encounter         DISPOSITION  Discharged    Admission was considered but after careful review of the patient's presentation, physical examination, diagnostic results, and response to treatment the patient may be safely discharged with outpatient follow-up.         Dictated utilizing Dragon dictation     Cruzito Calloway PA  09/11/24 1912

## 2024-09-11 NOTE — ED PROVIDER NOTES
Pt presents to the ED c/o head injury and laceration that occurred prior to arrival.  Admitted over while outside the house and accidentally hit the top of her head on the corner of the house.  No LOC.  Not on any blood thinners.     On exam,   General: No acute distress, nontoxic  HEENT: EOMI, vertex of the scalp with a linear laceration into the dermis without active bleeding  Pulm: Symmetric chest rise, nonlabored breathing  CV: Regular rate and rhythm  GI: Nondistended  MSK: No deformity  Skin: Warm, dry  Neuro: Awake, alert, oriented x 4, moving all extremities, no focal deficits  Psych: Calm, cooperative    Vital signs and nursing notes reviewed.           Plan: Plan for laceration repair likely with staples, no need for imaging studies or labs at this time.  Supportive care measures discussed outpatient follow-up, and ED return for worsening symptoms as needed.    ED Course as of 09/11/24 1655   Wed Sep 11, 2024   1551 Patient presents with head injury prior to arrival.  Patient bent over and hit her head on a corner.  No LOC, blood thinners.  She appears well.  I do not believe CT imaging is warranted.  Plan for wound closure. [EE]      ED Course User Index  [EE] Cruzito Calloway PA MD Attestation Note    SHARED VISIT: This visit was performed by BOTH a physician and an APC. The substantive portion of the medical decision making was performed by this attesting physician who made or approved the management plan and takes responsibility for patient management. All studies in the APC note (if performed) were independently interpreted by me.                   Sánchez Guevara MD  09/11/24 [id number]

## 2024-09-13 ENCOUNTER — PATIENT MESSAGE (OUTPATIENT)
Dept: INTERNAL MEDICINE | Facility: CLINIC | Age: 69
End: 2024-09-13
Payer: COMMERCIAL

## 2024-09-13 NOTE — TELEPHONE ENCOUNTER
From: Karishma Ocasio  To: Jacey Lockhart  Sent: 9/13/2024 10:01 AM EDT  Subject: ER visit and staples    Tommie Bruce,   I'm assuming you received a report that I was in ER Wednesday. I was bending over to  something off the ground and hit my head on the way down on the corner of a raised house addition. I did not lose consciousness and am find. 4 staples were put in with instructions to follow up with you in 7-10 days for removal. I would like to have my niece who is a nurse remove the 4 staples if that is ok with you.   Veronica

## 2024-09-30 DIAGNOSIS — I10 ESSENTIAL HYPERTENSION: ICD-10-CM

## 2024-10-01 RX ORDER — ESCITALOPRAM OXALATE 10 MG/1
TABLET ORAL
Qty: 90 TABLET | Refills: 1 | Status: SHIPPED | OUTPATIENT
Start: 2024-10-01

## 2024-10-11 DIAGNOSIS — I10 ESSENTIAL HYPERTENSION: ICD-10-CM

## 2024-10-11 RX ORDER — TRIAMTERENE AND HYDROCHLOROTHIAZIDE 37.5; 25 MG/1; MG/1
1 CAPSULE ORAL DAILY
Qty: 90 CAPSULE | Refills: 1 | Status: SHIPPED | OUTPATIENT
Start: 2024-10-11

## 2024-10-19 DIAGNOSIS — I10 PRIMARY HYPERTENSION: Chronic | ICD-10-CM

## 2024-10-21 RX ORDER — ATENOLOL 25 MG/1
25 TABLET ORAL DAILY
Qty: 90 TABLET | Refills: 1 | Status: SHIPPED | OUTPATIENT
Start: 2024-10-21

## 2024-10-22 ENCOUNTER — HOSPITAL ENCOUNTER (OUTPATIENT)
Dept: MAMMOGRAPHY | Facility: HOSPITAL | Age: 69
Discharge: HOME | End: 2024-10-22
Admitting: NURSE PRACTITIONER
Payer: MEDICARE

## 2024-10-22 DIAGNOSIS — Z12.31 SCREENING MAMMOGRAM FOR BREAST CANCER: ICD-10-CM

## 2024-10-22 PROCEDURE — 77063 BREAST TOMOSYNTHESIS BI: CPT

## 2024-10-22 PROCEDURE — 77067 SCR MAMMO BI INCL CAD: CPT

## 2025-02-03 ENCOUNTER — OFFICE VISIT (OUTPATIENT)
Dept: INTERNAL MEDICINE | Facility: CLINIC | Age: 70
End: 2025-02-03
Payer: MEDICARE

## 2025-02-03 VITALS
BODY MASS INDEX: 30.61 KG/M2 | WEIGHT: 195 LBS | DIASTOLIC BLOOD PRESSURE: 80 MMHG | HEART RATE: 88 BPM | SYSTOLIC BLOOD PRESSURE: 124 MMHG | HEIGHT: 67 IN | OXYGEN SATURATION: 94 %

## 2025-02-03 DIAGNOSIS — E11.9 TYPE 2 DIABETES MELLITUS WITHOUT COMPLICATION, WITHOUT LONG-TERM CURRENT USE OF INSULIN: Chronic | ICD-10-CM

## 2025-02-03 DIAGNOSIS — E78.2 MIXED HYPERLIPIDEMIA: Chronic | ICD-10-CM

## 2025-02-03 DIAGNOSIS — Z00.00 MEDICARE ANNUAL WELLNESS VISIT, SUBSEQUENT: Primary | ICD-10-CM

## 2025-02-03 DIAGNOSIS — I10 PRIMARY HYPERTENSION: Chronic | ICD-10-CM

## 2025-02-03 PROCEDURE — 3074F SYST BP LT 130 MM HG: CPT | Performed by: NURSE PRACTITIONER

## 2025-02-03 PROCEDURE — 99214 OFFICE O/P EST MOD 30 MIN: CPT | Performed by: NURSE PRACTITIONER

## 2025-02-03 PROCEDURE — G0439 PPPS, SUBSEQ VISIT: HCPCS | Performed by: NURSE PRACTITIONER

## 2025-02-03 PROCEDURE — 1160F RVW MEDS BY RX/DR IN RCRD: CPT | Performed by: NURSE PRACTITIONER

## 2025-02-03 PROCEDURE — 3079F DIAST BP 80-89 MM HG: CPT | Performed by: NURSE PRACTITIONER

## 2025-02-03 PROCEDURE — G2211 COMPLEX E/M VISIT ADD ON: HCPCS | Performed by: NURSE PRACTITIONER

## 2025-02-03 PROCEDURE — 1159F MED LIST DOCD IN RCRD: CPT | Performed by: NURSE PRACTITIONER

## 2025-02-03 PROCEDURE — 1126F AMNT PAIN NOTED NONE PRSNT: CPT | Performed by: NURSE PRACTITIONER

## 2025-02-03 NOTE — ASSESSMENT & PLAN NOTE
Lipid abnormalities are improving with treatment    Plan:  Continue same medication/s without change.      Discussed medication dosage, use, side effects, and goals of treatment in detail.    Counseled patient on lifestyle modifications to help control hyperlipidemia.     Patient Treatment Goals:   LDL goal is less than 70    Followup at the next regular appointment.    Orders:    Lipid Panel    TSH Rfx On Abnormal To Free T4

## 2025-02-03 NOTE — ASSESSMENT & PLAN NOTE
Hypertension is stable and controlled  Continue current treatment regimen.  Dietary sodium restriction.  Regular aerobic exercise.  Blood pressure will be reassessed in 6 months.    Orders:    CBC & Differential    Comprehensive Metabolic Panel

## 2025-02-03 NOTE — PROGRESS NOTES
Subjective   The ABCs of the Annual Wellness Visit  Medicare Wellness Visit      Karishma Ocasio is a 69 y.o. patient who presents for a Medicare Wellness Visit.    The following portions of the patient's history were reviewed and   updated as appropriate: allergies, current medications, past family history, past medical history, past social history, past surgical history, and problem list.    Compared to one year ago, the patient's physical   health is the same.  Compared to one year ago, the patient's mental   health is the same.    Recent Hospitalizations:  She was not admitted to the hospital during the last year.     Current Medical Providers:  Patient Care Team:  Jacey Lockhart APRN as PCP - General (Internal Medicine)  Tim Nolasco DO as Consulting Physician (Ophthalmology)    Outpatient Medications Prior to Visit   Medication Sig Dispense Refill    atenolol (TENORMIN) 25 MG tablet TAKE 1 TABLET BY MOUTH DAILY 90 tablet 1    atorvastatin (LIPITOR) 10 MG tablet TAKE 1 TABLET BY MOUTH EVERY EVENING 90 tablet 1    betamethasone dipropionate 0.05 % cream Apply  topically to the appropriate area as directed Daily As Needed for Irritation or Rash. 15 g 1    escitalopram (LEXAPRO) 10 MG tablet TAKE 1 TABLET BY MOUTH EVERY DAY 90 tablet 1    levocetirizine (XYZAL) 5 MG tablet Take 1 tablet by mouth Every Evening.      neomycin-polymyxin-hydrocortisone (CORTISPORIN) 1 % solution otic solution Administer 3 drops into both ears 4 (Four) Times a Day. (Patient taking differently: Administer 3 drops into both ears 4 (Four) Times a Day. As needed) 10 mL 1    triamterene-hydrochlorothiazide (DYAZIDE) 37.5-25 MG per capsule TAKE 1 CAPSULE BY MOUTH DAILY 90 capsule 1     No facility-administered medications prior to visit.     No opioid medication identified on active medication list. I have reviewed chart for other potential  high risk medication/s and harmful drug interactions in the elderly.      Aspirin is not  "on active medication list.  Aspirin use is not indicated based on review of current medical condition/s. Risk of harm outweighs potential benefits.  .    Patient Active Problem List   Diagnosis    Hypertension    Encounter for screening colonoscopy    Type 2 diabetes mellitus without complication, without long-term current use of insulin    Mixed hyperlipidemia    Healthcare maintenance    Eczema of external ear, bilateral    Vision abnormalities    Class 1 obesity due to excess calories with serious comorbidity and body mass index (BMI) of 32.0 to 32.9 in adult    Seasonal allergies    Overweight with body mass index (BMI) of 29 to 29.9 in adult     Advance Care Planning Advance Directive is on file.  ACP discussion was held with the patient during this visit. Patient has an advance directive in EMR which is still valid.             Objective   Vitals:    25 0753   BP: 124/80   BP Location: Left arm   Patient Position: Sitting   Cuff Size: Adult   Pulse: 88   SpO2: 94%   Weight: 88.5 kg (195 lb)   Height: 170.2 cm (67\")   PainSc: 0-No pain       Estimated body mass index is 30.54 kg/m² as calculated from the following:    Height as of this encounter: 170.2 cm (67\").    Weight as of this encounter: 88.5 kg (195 lb).                Does the patient have evidence of cognitive impairment? No                                                                                                Health  Risk Assessment    Smoking Status:  Social History     Tobacco Use   Smoking Status Former    Current packs/day: 0.00    Average packs/day: 0.7 packs/day for 22.5 years (15.0 ttl pk-yrs)    Types: Cigarettes    Start date: 1985    Quit date: 2000    Years since quittin.1   Smokeless Tobacco Never     Alcohol Consumption:  Social History     Substance and Sexual Activity   Alcohol Use No       Fall Risk Screen  STEADI Fall Risk Assessment was completed, and patient is at LOW risk for falls.Assessment completed " on:2/3/2025    Depression Screening   Little interest or pleasure in doing things? Not at all   Feeling down, depressed, or hopeless? Not at all   PHQ-2 Total Score 0      Health Habits and Functional and Cognitive Screenin/28/2025     8:02 PM   Functional & Cognitive Status   Do you have difficulty preparing food and eating? No    Do you have difficulty bathing yourself, getting dressed or grooming yourself? No    Do you have difficulty using the toilet? No    Do you have difficulty moving around from place to place? No    Do you have trouble with steps or getting out of a bed or a chair? No    Current Diet Limited Junk Food    Dental Exam Up to date    Eye Exam Up to date    Exercise (times per week) 6 times per week    Current Exercises Include Stationary Bicycling/Spin Class;Walking;Weightlifting    Do you need help using the phone?  No    Are you deaf or do you have serious difficulty hearing?  No    Do you need help to go to places out of walking distance? No    Do you need help shopping? No    Do you need help preparing meals?  No    Do you need help with housework?  No    Do you need help with laundry? No    Do you need help taking your medications? No    Do you need help managing money? No    Do you ever drive or ride in a car without wearing a seat belt? No    Have you felt unusual stress, anger or loneliness in the last month? No    Who do you live with? Spouse    If you need help, do you have trouble finding someone available to you? No    Have you been bothered in the last four weeks by sexual problems? No    Do you have difficulty concentrating, remembering or making decisions? No        Patient-reported           Age-appropriate Screening Schedule:  Refer to the list below for future screening recommendations based on patient's age, sex and/or medical conditions. Orders for these recommended tests are listed in the plan section. The patient has been provided with a written plan.    Health  Maintenance List  Health Maintenance   Topic Date Due    HEMOGLOBIN A1C  01/29/2025    DIABETIC FOOT EXAM  02/18/2025 (Originally 9/26/2024)    COVID-19 Vaccine (5 - 2024-25 season) 04/25/2025 (Originally 9/1/2024)    DXA SCAN  07/12/2025    LIPID PANEL  07/29/2025    URINE MICROALBUMIN  07/29/2025    BMI FOLLOWUP  07/29/2025    DIABETIC EYE EXAM  12/26/2025    ANNUAL WELLNESS VISIT  02/03/2026    MAMMOGRAM  10/22/2026    COLORECTAL CANCER SCREENING  03/30/2028    TDAP/TD VACCINES (2 - Td or Tdap) 09/11/2034    INFLUENZA VACCINE  Completed    Pneumococcal Vaccine 65+  Completed    ZOSTER VACCINE  Completed    HEPATITIS C SCREENING  Addressed    PAP SMEAR  Discontinued                                                                                                                                                CMS Preventative Services Quick Reference  Risk Factors Identified During Encounter  Dental Screening Recommended  Vision Screening Recommended    The above risks/problems have been discussed with the patient.  Pertinent information has been shared with the patient in the After Visit Summary.  An After Visit Summary and PPPS were made available to the patient.    Follow Up:   Next Medicare Wellness visit to be scheduled in 1 year.         Additional E&M Note during same encounter follows:  Patient has additional, significant, and separately identifiable condition(s)/problem(s) that require work above and beyond the Medicare Wellness Visit     Chief Complaint  Medicare Wellness-subsequent    Subjective   HPI  Veronica is also being seen today for additional medical problem/s.    DM2-- continues with lifestyle modification; last A1c 6.2 7/29/25; Diabetic eye exam completed 12/26/25     HLD-- continues on statin therapy; continues with lifestyle modification.      HTN-- continues on atenolol, dyazide; denies CP or SOA; monitoring BP at home.      Hx anxiety-- stable on lexapro; denies SI.         Objective   Vital  "Signs:  /80 (BP Location: Left arm, Patient Position: Sitting, Cuff Size: Adult)   Pulse 88   Ht 170.2 cm (67\")   Wt 88.5 kg (195 lb)   SpO2 94%   BMI 30.54 kg/m²   Physical Exam  Constitutional:       Appearance: Normal appearance.   HENT:      Head: Normocephalic and atraumatic.      Right Ear: External ear normal.      Left Ear: External ear normal.      Nose: Nose normal.      Mouth/Throat:      Mouth: Mucous membranes are moist.      Pharynx: Oropharynx is clear.   Eyes:      Conjunctiva/sclera: Conjunctivae normal.      Pupils: Pupils are equal, round, and reactive to light.   Cardiovascular:      Rate and Rhythm: Normal rate and regular rhythm.      Pulses: Normal pulses.      Heart sounds: Normal heart sounds. No murmur heard.     No gallop.   Pulmonary:      Effort: Pulmonary effort is normal. No respiratory distress.      Breath sounds: Normal breath sounds. No stridor. No wheezing, rhonchi or rales.   Musculoskeletal:         General: No swelling.      Cervical back: Normal range of motion and neck supple.   Skin:     General: Skin is warm and dry.      Capillary Refill: Capillary refill takes less than 2 seconds.   Neurological:      Mental Status: She is alert and oriented to person, place, and time. Mental status is at baseline.   Psychiatric:         Mood and Affect: Mood normal.         Thought Content: Thought content normal.         Judgment: Judgment normal.         The following data was reviewed by: CRISTIAN Claros on 02/03/2025:    Common labs          7/29/2024    09:24   Common Labs   Glucose 104    BUN 23    Creatinine 0.83    Sodium 138    Potassium 4.4    Chloride 99    Calcium 9.8    Total Protein 7.2    Albumin 4.4    Total Bilirubin 0.5    Alkaline Phosphatase 78    AST (SGOT) 17    ALT (SGPT) 14    WBC 6.6    Hemoglobin 13.0    Hematocrit 39.8    Platelets 274    Total Cholesterol 148    Triglycerides 102    HDL Cholesterol 65    LDL Cholesterol  65    Hemoglobin A1C 6.2 "    Microalbumin, Urine 5.5      Tobacco Use: Medium Risk (2/3/2025)    Patient History     Smoking Tobacco Use: Former     Smokeless Tobacco Use: Never     Passive Exposure: Not on file     Social History     Substance and Sexual Activity   Alcohol Use No     Family History   Problem Relation Age of Onset    Heart disease Mother     Hypertension Mother     Vision loss Mother         macular degeneration    Heart disease Father     Hypertension Father     No Known Problems Son     No Known Problems Son     No Known Problems Sister     No Known Problems Sister     No Known Problems Other              Assessment and Plan Additional age appropriate preventative wellness advice topics were discussed during today's preventative wellness exam(some topics already addressed during AWV portion of the note above):    Physical Activity: Advised cardiovascular activity 150 minutes per week as tolerated. (example brisk walk for 30 minutes, 5 days a week).     Nutrition: Discussed nutrition plan with patient. Information shared in after visit summary. Goal is for a well balanced diet to enhance overall health.     Healthy Weight: Discussed current and goal BMI with patient. Steps to attain this goal discussed. Information shared in after visit summary.           Medicare annual wellness visit, subsequent         Primary hypertension  Hypertension is stable and controlled  Continue current treatment regimen.  Dietary sodium restriction.  Regular aerobic exercise.  Blood pressure will be reassessed in 6 months.    Orders:    CBC & Differential    Comprehensive Metabolic Panel    Mixed hyperlipidemia   Lipid abnormalities are improving with treatment    Plan:  Continue same medication/s without change.      Discussed medication dosage, use, side effects, and goals of treatment in detail.    Counseled patient on lifestyle modifications to help control hyperlipidemia.     Patient Treatment Goals:   LDL goal is less than 70    Followup  at the next regular appointment.    Orders:    Lipid Panel    TSH Rfx On Abnormal To Free T4    Type 2 diabetes mellitus without complication, without long-term current use of insulin  Diabetes is improving with lifestyle modifications.   Recommended an ADA diet.  Regular aerobic exercise.  Discussed foot care.  Reminded to get yearly retinal exam.  Diabetes will be reassessed in 6 months    Orders:    Hemoglobin A1c    Microalbumin / Creatinine Urine Ratio - Urine, Clean Catch            Follow Up   Return in about 6 months (around 8/3/2025) for Recheck chronic conditions.  Patient was given instructions and counseling regarding her condition or for health maintenance advice. Please see specific information pulled into the AVS if appropriate.

## 2025-02-03 NOTE — ASSESSMENT & PLAN NOTE
Diabetes is improving with lifestyle modifications.   Recommended an ADA diet.  Regular aerobic exercise.  Discussed foot care.  Reminded to get yearly retinal exam.  Diabetes will be reassessed in 6 months    Orders:    Hemoglobin A1c    Microalbumin / Creatinine Urine Ratio - Urine, Clean Catch

## 2025-02-04 LAB
ALBUMIN SERPL-MCNC: 4.4 G/DL (ref 3.5–5.2)
ALBUMIN/CREAT UR: 3 MG/G CREAT (ref 0–29)
ALBUMIN/GLOB SERPL: 1.4 G/DL
ALP SERPL-CCNC: 75 U/L (ref 39–117)
ALT SERPL-CCNC: 18 U/L (ref 1–33)
AST SERPL-CCNC: 19 U/L (ref 1–32)
BASOPHILS # BLD AUTO: 0.06 10*3/MM3 (ref 0–0.2)
BASOPHILS NFR BLD AUTO: 0.9 % (ref 0–1.5)
BILIRUB SERPL-MCNC: 0.5 MG/DL (ref 0–1.2)
BUN SERPL-MCNC: 19 MG/DL (ref 8–23)
BUN/CREAT SERPL: 23.5 (ref 7–25)
CALCIUM SERPL-MCNC: 9.7 MG/DL (ref 8.6–10.5)
CHLORIDE SERPL-SCNC: 98 MMOL/L (ref 98–107)
CHOLEST SERPL-MCNC: 164 MG/DL (ref 0–200)
CO2 SERPL-SCNC: 27.7 MMOL/L (ref 22–29)
CREAT SERPL-MCNC: 0.81 MG/DL (ref 0.57–1)
CREAT UR-MCNC: 89.7 MG/DL
EGFRCR SERPLBLD CKD-EPI 2021: 79.7 ML/MIN/1.73
EOSINOPHIL # BLD AUTO: 0.35 10*3/MM3 (ref 0–0.4)
EOSINOPHIL NFR BLD AUTO: 5.2 % (ref 0.3–6.2)
ERYTHROCYTE [DISTWIDTH] IN BLOOD BY AUTOMATED COUNT: 12.9 % (ref 12.3–15.4)
GLOBULIN SER CALC-MCNC: 3.2 GM/DL
GLUCOSE SERPL-MCNC: 110 MG/DL (ref 65–99)
HBA1C MFR BLD: 6 % (ref 4.8–5.6)
HCT VFR BLD AUTO: 36.4 % (ref 34–46.6)
HDLC SERPL-MCNC: 69 MG/DL (ref 40–60)
HGB BLD-MCNC: 12.1 G/DL (ref 12–15.9)
IMM GRANULOCYTES # BLD AUTO: 0.02 10*3/MM3 (ref 0–0.05)
IMM GRANULOCYTES NFR BLD AUTO: 0.3 % (ref 0–0.5)
LDLC SERPL CALC-MCNC: 74 MG/DL (ref 0–100)
LYMPHOCYTES # BLD AUTO: 1.79 10*3/MM3 (ref 0.7–3.1)
LYMPHOCYTES NFR BLD AUTO: 26.6 % (ref 19.6–45.3)
MCH RBC QN AUTO: 28.9 PG (ref 26.6–33)
MCHC RBC AUTO-ENTMCNC: 33.2 G/DL (ref 31.5–35.7)
MCV RBC AUTO: 87.1 FL (ref 79–97)
MICROALBUMIN UR-MCNC: 3 UG/ML
MONOCYTES # BLD AUTO: 0.63 10*3/MM3 (ref 0.1–0.9)
MONOCYTES NFR BLD AUTO: 9.4 % (ref 5–12)
NEUTROPHILS # BLD AUTO: 3.88 10*3/MM3 (ref 1.7–7)
NEUTROPHILS NFR BLD AUTO: 57.6 % (ref 42.7–76)
NRBC BLD AUTO-RTO: 0 /100 WBC (ref 0–0.2)
PLATELET # BLD AUTO: 326 10*3/MM3 (ref 140–450)
POTASSIUM SERPL-SCNC: 4.4 MMOL/L (ref 3.5–5.2)
PROT SERPL-MCNC: 7.6 G/DL (ref 6–8.5)
RBC # BLD AUTO: 4.18 10*6/MM3 (ref 3.77–5.28)
SODIUM SERPL-SCNC: 137 MMOL/L (ref 136–145)
TRIGL SERPL-MCNC: 121 MG/DL (ref 0–150)
TSH SERPL DL<=0.005 MIU/L-ACNC: 1.91 UIU/ML (ref 0.27–4.2)
VLDLC SERPL CALC-MCNC: 21 MG/DL (ref 5–40)
WBC # BLD AUTO: 6.73 10*3/MM3 (ref 3.4–10.8)

## 2025-03-08 DIAGNOSIS — E78.2 MIXED HYPERLIPIDEMIA: Chronic | ICD-10-CM

## 2025-03-10 RX ORDER — ATORVASTATIN CALCIUM 10 MG/1
10 TABLET, FILM COATED ORAL EVERY EVENING
Qty: 90 TABLET | Refills: 1 | Status: SHIPPED | OUTPATIENT
Start: 2025-03-10

## 2025-03-28 DIAGNOSIS — I10 ESSENTIAL HYPERTENSION: ICD-10-CM

## 2025-03-28 RX ORDER — ESCITALOPRAM OXALATE 10 MG/1
10 TABLET ORAL DAILY
Qty: 90 TABLET | Refills: 1 | Status: SHIPPED | OUTPATIENT
Start: 2025-03-28

## 2025-04-12 DIAGNOSIS — I10 ESSENTIAL HYPERTENSION: ICD-10-CM

## 2025-04-14 RX ORDER — TRIAMTERENE AND HYDROCHLOROTHIAZIDE 37.5; 25 MG/1; MG/1
1 CAPSULE ORAL DAILY
Qty: 90 CAPSULE | Refills: 1 | Status: SHIPPED | OUTPATIENT
Start: 2025-04-14

## 2025-04-17 DIAGNOSIS — I10 PRIMARY HYPERTENSION: Chronic | ICD-10-CM

## 2025-04-17 RX ORDER — ATENOLOL 25 MG/1
25 TABLET ORAL DAILY
Qty: 90 TABLET | Refills: 1 | Status: SHIPPED | OUTPATIENT
Start: 2025-04-17

## 2025-08-04 ENCOUNTER — OFFICE VISIT (OUTPATIENT)
Dept: INTERNAL MEDICINE | Facility: CLINIC | Age: 70
End: 2025-08-04
Payer: MEDICARE

## 2025-08-04 VITALS
HEART RATE: 68 BPM | WEIGHT: 196 LBS | SYSTOLIC BLOOD PRESSURE: 122 MMHG | BODY MASS INDEX: 30.76 KG/M2 | DIASTOLIC BLOOD PRESSURE: 84 MMHG | HEIGHT: 67 IN | OXYGEN SATURATION: 93 %

## 2025-08-04 DIAGNOSIS — Z78.0 POST-MENOPAUSAL: ICD-10-CM

## 2025-08-04 DIAGNOSIS — E78.2 MIXED HYPERLIPIDEMIA: Chronic | ICD-10-CM

## 2025-08-04 DIAGNOSIS — I10 PRIMARY HYPERTENSION: Chronic | ICD-10-CM

## 2025-08-04 DIAGNOSIS — E11.9 TYPE 2 DIABETES MELLITUS WITHOUT COMPLICATION, WITHOUT LONG-TERM CURRENT USE OF INSULIN: Primary | Chronic | ICD-10-CM

## 2025-08-04 LAB
ALBUMIN SERPL-MCNC: 4.4 G/DL (ref 3.5–5.2)
ALBUMIN/GLOB SERPL: 1.5 G/DL
ALP SERPL-CCNC: 75 U/L (ref 39–117)
ALT SERPL-CCNC: 21 U/L (ref 1–33)
AST SERPL-CCNC: 24 U/L (ref 1–32)
BASOPHILS # BLD AUTO: 0.08 10*3/MM3 (ref 0–0.2)
BASOPHILS NFR BLD AUTO: 1.1 % (ref 0–1.5)
BILIRUB SERPL-MCNC: 0.5 MG/DL (ref 0–1.2)
BUN SERPL-MCNC: 20 MG/DL (ref 8–23)
BUN/CREAT SERPL: 23 (ref 7–25)
CALCIUM SERPL-MCNC: 9.7 MG/DL (ref 8.6–10.5)
CHLORIDE SERPL-SCNC: 102 MMOL/L (ref 98–107)
CHOLEST SERPL-MCNC: 150 MG/DL (ref 0–200)
CO2 SERPL-SCNC: 25.1 MMOL/L (ref 22–29)
CREAT SERPL-MCNC: 0.87 MG/DL (ref 0.57–1)
EGFRCR SERPLBLD CKD-EPI 2021: 72.7 ML/MIN/1.73
EOSINOPHIL # BLD AUTO: 0.41 10*3/MM3 (ref 0–0.4)
EOSINOPHIL NFR BLD AUTO: 5.5 % (ref 0.3–6.2)
ERYTHROCYTE [DISTWIDTH] IN BLOOD BY AUTOMATED COUNT: 12.8 % (ref 12.3–15.4)
GLOBULIN SER CALC-MCNC: 2.9 GM/DL
GLUCOSE SERPL-MCNC: 108 MG/DL (ref 65–99)
HBA1C MFR BLD: 6.2 % (ref 4.8–5.6)
HCT VFR BLD AUTO: 37 % (ref 34–46.6)
HDLC SERPL-MCNC: 66 MG/DL (ref 40–60)
HGB BLD-MCNC: 12.1 G/DL (ref 12–15.9)
IMM GRANULOCYTES # BLD AUTO: 0.01 10*3/MM3 (ref 0–0.05)
IMM GRANULOCYTES NFR BLD AUTO: 0.1 % (ref 0–0.5)
LDLC SERPL CALC-MCNC: 63 MG/DL (ref 0–100)
LYMPHOCYTES # BLD AUTO: 2.02 10*3/MM3 (ref 0.7–3.1)
LYMPHOCYTES NFR BLD AUTO: 27.2 % (ref 19.6–45.3)
MCH RBC QN AUTO: 28.3 PG (ref 26.6–33)
MCHC RBC AUTO-ENTMCNC: 32.7 G/DL (ref 31.5–35.7)
MCV RBC AUTO: 86.7 FL (ref 79–97)
MONOCYTES # BLD AUTO: 0.63 10*3/MM3 (ref 0.1–0.9)
MONOCYTES NFR BLD AUTO: 8.5 % (ref 5–12)
NEUTROPHILS # BLD AUTO: 4.28 10*3/MM3 (ref 1.7–7)
NEUTROPHILS NFR BLD AUTO: 57.6 % (ref 42.7–76)
NRBC BLD AUTO-RTO: 0 /100 WBC (ref 0–0.2)
PLATELET # BLD AUTO: 301 10*3/MM3 (ref 140–450)
POTASSIUM SERPL-SCNC: 4.2 MMOL/L (ref 3.5–5.2)
PROT SERPL-MCNC: 7.3 G/DL (ref 6–8.5)
RBC # BLD AUTO: 4.27 10*6/MM3 (ref 3.77–5.28)
SODIUM SERPL-SCNC: 139 MMOL/L (ref 136–145)
TRIGL SERPL-MCNC: 117 MG/DL (ref 0–150)
TSH SERPL DL<=0.005 MIU/L-ACNC: 2.27 UIU/ML (ref 0.27–4.2)
VLDLC SERPL CALC-MCNC: 21 MG/DL (ref 5–40)
WBC # BLD AUTO: 7.43 10*3/MM3 (ref 3.4–10.8)

## 2025-08-04 PROCEDURE — 3079F DIAST BP 80-89 MM HG: CPT | Performed by: NURSE PRACTITIONER

## 2025-08-04 PROCEDURE — 3044F HG A1C LEVEL LT 7.0%: CPT | Performed by: NURSE PRACTITIONER

## 2025-08-04 PROCEDURE — 1126F AMNT PAIN NOTED NONE PRSNT: CPT | Performed by: NURSE PRACTITIONER

## 2025-08-04 PROCEDURE — 99214 OFFICE O/P EST MOD 30 MIN: CPT | Performed by: NURSE PRACTITIONER

## 2025-08-04 PROCEDURE — 1159F MED LIST DOCD IN RCRD: CPT | Performed by: NURSE PRACTITIONER

## 2025-08-04 PROCEDURE — 1160F RVW MEDS BY RX/DR IN RCRD: CPT | Performed by: NURSE PRACTITIONER

## 2025-08-04 PROCEDURE — 3074F SYST BP LT 130 MM HG: CPT | Performed by: NURSE PRACTITIONER

## (undated) DEVICE — CANN NASL CO2 TRULINK W/O2 A/

## (undated) DEVICE — TUBING, SUCTION, 1/4" X 10', STRAIGHT: Brand: MEDLINE

## (undated) DEVICE — Device: Brand: DEFENDO AIR/WATER/SUCTION AND BIOPSY VALVE

## (undated) DEVICE — THE TORRENT IRRIGATION SCOPE CONNECTOR IS USED WITH THE TORRENT IRRIGATION TUBING TO PROVIDE IRRIGATION FLUIDS SUCH AS STERILE WATER DURING GASTROINTESTINAL ENDOSCOPIC PROCEDURES WHEN USED IN CONJUNCTION WITH AN IRRIGATION PUMP (OR ELECTROSURGICAL UNIT).: Brand: TORRENT